# Patient Record
Sex: FEMALE | Race: WHITE | NOT HISPANIC OR LATINO | URBAN - METROPOLITAN AREA
[De-identification: names, ages, dates, MRNs, and addresses within clinical notes are randomized per-mention and may not be internally consistent; named-entity substitution may affect disease eponyms.]

---

## 2017-03-01 ENCOUNTER — OUTPATIENT (OUTPATIENT)
Dept: OUTPATIENT SERVICES | Facility: HOSPITAL | Age: 72
LOS: 1 days | Discharge: HOME | End: 2017-03-01

## 2017-06-27 DIAGNOSIS — M79.609 PAIN IN UNSPECIFIED LIMB: ICD-10-CM

## 2018-05-15 ENCOUNTER — OUTPATIENT (OUTPATIENT)
Dept: OUTPATIENT SERVICES | Facility: HOSPITAL | Age: 73
LOS: 1 days | Discharge: HOME | End: 2018-05-15

## 2018-05-15 DIAGNOSIS — Z12.31 ENCOUNTER FOR SCREENING MAMMOGRAM FOR MALIGNANT NEOPLASM OF BREAST: ICD-10-CM

## 2018-05-22 PROBLEM — Z00.00 ENCOUNTER FOR PREVENTIVE HEALTH EXAMINATION: Status: ACTIVE | Noted: 2018-05-22

## 2018-06-21 ENCOUNTER — APPOINTMENT (OUTPATIENT)
Dept: SURGERY | Facility: CLINIC | Age: 73
End: 2018-06-21
Payer: MEDICARE

## 2018-06-21 VITALS
WEIGHT: 275 LBS | DIASTOLIC BLOOD PRESSURE: 84 MMHG | HEIGHT: 63 IN | SYSTOLIC BLOOD PRESSURE: 144 MMHG | BODY MASS INDEX: 48.73 KG/M2

## 2018-06-21 DIAGNOSIS — R01.1 CARDIAC MURMUR, UNSPECIFIED: ICD-10-CM

## 2018-06-21 DIAGNOSIS — I10 ESSENTIAL (PRIMARY) HYPERTENSION: ICD-10-CM

## 2018-06-21 DIAGNOSIS — E11.9 TYPE 2 DIABETES MELLITUS W/OUT COMPLICATIONS: ICD-10-CM

## 2018-06-21 DIAGNOSIS — H54.61 UNQUALIFIED VISUAL LOSS, RIGHT EYE, NORMAL VISION LEFT EYE: ICD-10-CM

## 2018-06-21 DIAGNOSIS — G62.9 POLYNEUROPATHY, UNSPECIFIED: ICD-10-CM

## 2018-06-21 DIAGNOSIS — R22.42 LOCALIZED SWELLING, MASS AND LUMP, LEFT LOWER LIMB: ICD-10-CM

## 2018-06-21 PROCEDURE — 99202 OFFICE O/P NEW SF 15 MIN: CPT

## 2018-06-22 PROBLEM — R22.42 MASS OF SOFT TISSUE OF LEFT LOWER EXTREMITY: Status: ACTIVE | Noted: 2018-06-22

## 2018-06-22 PROBLEM — R01.1 HEART MURMUR: Status: ACTIVE | Noted: 2018-06-21

## 2018-06-22 PROBLEM — H54.61 VISION LOSS OF RIGHT EYE: Status: ACTIVE | Noted: 2018-06-21

## 2018-06-22 PROBLEM — I10 HIGH BLOOD PRESSURE: Status: ACTIVE | Noted: 2018-06-21

## 2018-06-22 PROBLEM — G62.9 NEUROPATHY: Status: ACTIVE | Noted: 2018-06-21

## 2018-06-22 PROBLEM — E11.9 DIABETES: Status: ACTIVE | Noted: 2018-06-21

## 2018-06-22 RX ORDER — METHENAMINE HIPPURATE 1 G/1
1 TABLET ORAL
Qty: 180 | Refills: 0 | Status: ACTIVE | COMMUNITY
Start: 2017-12-18

## 2018-06-22 RX ORDER — METFORMIN HYDROCHLORIDE 1000 MG/1
1000 TABLET, COATED ORAL
Qty: 180 | Refills: 0 | Status: ACTIVE | COMMUNITY
Start: 2017-08-25

## 2018-06-22 RX ORDER — GABAPENTIN 400 MG/1
400 CAPSULE ORAL
Qty: 90 | Refills: 0 | Status: ACTIVE | COMMUNITY
Start: 2018-02-15

## 2018-06-22 RX ORDER — SEMAGLUTIDE 1.34 MG/ML
2 INJECTION, SOLUTION SUBCUTANEOUS
Qty: 3 | Refills: 0 | Status: ACTIVE | COMMUNITY
Start: 2018-04-09

## 2018-06-22 RX ORDER — ERGOCALCIFEROL 1.25 MG/1
1.25 MG CAPSULE ORAL
Refills: 0 | Status: ACTIVE | COMMUNITY

## 2018-06-22 RX ORDER — VALSARTAN 160 MG/1
160 TABLET, COATED ORAL
Qty: 90 | Refills: 0 | Status: ACTIVE | COMMUNITY
Start: 2018-03-20

## 2018-06-22 RX ORDER — OMEPRAZOLE 40 MG/1
40 CAPSULE, DELAYED RELEASE ORAL
Refills: 0 | Status: ACTIVE | COMMUNITY

## 2018-06-22 RX ORDER — MOMETASONE FUROATE 1 MG/G
0.1 CREAM TOPICAL
Qty: 60 | Refills: 0 | Status: ACTIVE | COMMUNITY
Start: 2018-03-20

## 2018-06-22 RX ORDER — HYDROCODONE BITARTRATE AND ACETAMINOPHEN 10; 325 MG/1; MG/1
10-325 TABLET ORAL
Qty: 120 | Refills: 0 | Status: ACTIVE | COMMUNITY
Start: 2018-03-15

## 2018-06-22 RX ORDER — INSULIN DETEMIR 100 [IU]/ML
100 INJECTION, SOLUTION SUBCUTANEOUS
Qty: 90 | Refills: 0 | Status: ACTIVE | COMMUNITY
Start: 2018-02-21

## 2018-06-22 RX ORDER — BUMETANIDE 1 MG/1
1 TABLET ORAL
Qty: 90 | Refills: 0 | Status: ACTIVE | COMMUNITY
Start: 2018-04-23

## 2018-06-22 RX ORDER — VALSARTAN 160 MG/1
160 TABLET ORAL
Refills: 0 | Status: ACTIVE | COMMUNITY

## 2018-06-22 RX ORDER — DILTIAZEM HYDROCHLORIDE 360 MG/1
360 CAPSULE, EXTENDED RELEASE ORAL
Qty: 90 | Refills: 0 | Status: ACTIVE | COMMUNITY
Start: 2017-09-25

## 2018-06-22 RX ORDER — LIRAGLUTIDE 6 MG/ML
18 INJECTION SUBCUTANEOUS
Qty: 18 | Refills: 0 | Status: ACTIVE | COMMUNITY
Start: 2018-02-21

## 2018-06-22 RX ORDER — NYSTATIN 100000 [USP'U]/ML
100000 SUSPENSION ORAL
Qty: 600 | Refills: 0 | Status: ACTIVE | COMMUNITY
Start: 2017-09-25

## 2018-06-22 RX ORDER — ALBUTEROL SULFATE 90 UG/1
108 (90 BASE) AEROSOL, METERED RESPIRATORY (INHALATION)
Qty: 255 | Refills: 0 | Status: ACTIVE | COMMUNITY
Start: 2018-02-21

## 2018-06-22 RX ORDER — INSULIN DETEMIR 100 [IU]/ML
100 INJECTION, SOLUTION SUBCUTANEOUS
Refills: 0 | Status: ACTIVE | COMMUNITY

## 2018-06-22 RX ORDER — ROSUVASTATIN CALCIUM 10 MG/1
10 TABLET, FILM COATED ORAL
Qty: 90 | Refills: 0 | Status: ACTIVE | COMMUNITY
Start: 2018-04-04

## 2018-06-22 RX ORDER — CIPROFLOXACIN HYDROCHLORIDE 500 MG/1
500 TABLET, FILM COATED ORAL
Qty: 20 | Refills: 0 | Status: ACTIVE | COMMUNITY
Start: 2018-03-19

## 2018-06-22 RX ORDER — OMEPRAZOLE 40 MG/1
40 CAPSULE, DELAYED RELEASE ORAL
Qty: 90 | Refills: 0 | Status: ACTIVE | COMMUNITY
Start: 2018-03-20

## 2018-06-22 RX ORDER — PREGABALIN 100 MG/1
100 CAPSULE ORAL
Qty: 90 | Refills: 0 | Status: ACTIVE | COMMUNITY
Start: 2018-03-15

## 2018-06-22 RX ORDER — INSULIN LISPRO 100 [IU]/ML
100 INJECTION, SOLUTION INTRAVENOUS; SUBCUTANEOUS
Refills: 0 | Status: ACTIVE | COMMUNITY

## 2018-06-22 RX ORDER — ROSUVASTATIN CALCIUM 5 MG/1
5 TABLET, FILM COATED ORAL
Refills: 0 | Status: ACTIVE | COMMUNITY

## 2018-06-22 RX ORDER — METHENAMINE HIPPURATE 1 G/1
1 TABLET ORAL
Refills: 0 | Status: ACTIVE | COMMUNITY

## 2018-06-22 RX ORDER — METOPROLOL TARTRATE 50 MG/1
50 TABLET, FILM COATED ORAL
Qty: 180 | Refills: 0 | Status: ACTIVE | COMMUNITY
Start: 2017-03-07

## 2018-06-22 RX ORDER — NITROFURANTOIN (MONOHYDRATE/MACROCRYSTALS) 25; 75 MG/1; MG/1
100 CAPSULE ORAL
Qty: 20 | Refills: 0 | Status: ACTIVE | COMMUNITY
Start: 2018-03-07

## 2018-06-22 RX ORDER — FUROSEMIDE 40 MG/1
40 TABLET ORAL
Refills: 0 | Status: ACTIVE | COMMUNITY

## 2018-06-22 RX ORDER — METOPROLOL TARTRATE 50 MG/1
50 TABLET, FILM COATED ORAL
Refills: 0 | Status: ACTIVE | COMMUNITY

## 2018-06-22 RX ORDER — INSULIN LISPRO 100 [IU]/ML
100 INJECTION, SOLUTION INTRAVENOUS; SUBCUTANEOUS
Qty: 60 | Refills: 0 | Status: ACTIVE | COMMUNITY
Start: 2018-02-21

## 2018-06-22 RX ORDER — GENTAMICIN SULFATE 1 MG/G
0.1 OINTMENT TOPICAL
Qty: 30 | Refills: 0 | Status: ACTIVE | COMMUNITY
Start: 2018-05-25

## 2018-06-22 RX ORDER — DULOXETINE HYDROCHLORIDE 60 MG/1
60 CAPSULE, DELAYED RELEASE PELLETS ORAL
Refills: 0 | Status: ACTIVE | COMMUNITY

## 2018-06-22 RX ORDER — ZOLPIDEM TARTRATE 10 MG/1
10 TABLET ORAL
Qty: 30 | Refills: 0 | Status: ACTIVE | COMMUNITY
Start: 2018-03-15

## 2018-06-22 RX ORDER — DILTIAZEM HYDROCHLORIDE 180 MG/1
180 CAPSULE, COATED, EXTENDED RELEASE ORAL
Refills: 0 | Status: ACTIVE | COMMUNITY

## 2018-06-22 RX ORDER — FLUCONAZOLE 150 MG/1
150 TABLET ORAL
Qty: 1 | Refills: 0 | Status: ACTIVE | COMMUNITY
Start: 2018-04-07

## 2018-06-22 RX ORDER — DULOXETINE HYDROCHLORIDE 60 MG/1
60 CAPSULE, DELAYED RELEASE PELLETS ORAL
Qty: 90 | Refills: 0 | Status: ACTIVE | COMMUNITY
Start: 2017-10-30

## 2018-06-22 RX ORDER — GABAPENTIN 600 MG/1
600 TABLET, COATED ORAL
Qty: 90 | Refills: 0 | Status: ACTIVE | COMMUNITY
Start: 2018-04-20

## 2018-06-22 RX ORDER — OXYCODONE 10 MG/1
10 TABLET ORAL
Refills: 0 | Status: ACTIVE | COMMUNITY

## 2018-06-22 RX ORDER — AMOXICILLIN AND CLAVULANATE POTASSIUM 875; 125 MG/1; MG/1
875-125 TABLET, COATED ORAL
Qty: 20 | Refills: 0 | Status: ACTIVE | COMMUNITY
Start: 2018-06-05

## 2018-06-22 RX ORDER — PREGABALIN 100 MG/1
100 CAPSULE ORAL
Refills: 0 | Status: ACTIVE | COMMUNITY

## 2018-06-22 RX ORDER — FLUTICASONE PROPIONATE 50 UG/1
50 SPRAY, METERED NASAL
Qty: 16 | Refills: 0 | Status: ACTIVE | COMMUNITY
Start: 2018-03-19

## 2018-06-22 RX ORDER — ALPRAZOLAM 0.25 MG/1
0.25 TABLET ORAL
Qty: 10 | Refills: 0 | Status: ACTIVE | COMMUNITY
Start: 2018-06-14

## 2019-11-05 ENCOUNTER — OUTPATIENT (OUTPATIENT)
Dept: OUTPATIENT SERVICES | Facility: HOSPITAL | Age: 74
LOS: 1 days | Discharge: HOME | End: 2019-11-05
Payer: MEDICARE

## 2019-11-05 DIAGNOSIS — Z12.31 ENCOUNTER FOR SCREENING MAMMOGRAM FOR MALIGNANT NEOPLASM OF BREAST: ICD-10-CM

## 2019-11-05 PROCEDURE — 77063 BREAST TOMOSYNTHESIS BI: CPT | Mod: 26

## 2019-11-05 PROCEDURE — 77067 SCR MAMMO BI INCL CAD: CPT | Mod: 26

## 2020-06-16 ENCOUNTER — APPOINTMENT (OUTPATIENT)
Dept: BURN CARE | Facility: CLINIC | Age: 75
End: 2020-06-16
Payer: MEDICARE

## 2020-06-16 ENCOUNTER — OUTPATIENT (OUTPATIENT)
Dept: OUTPATIENT SERVICES | Facility: HOSPITAL | Age: 75
LOS: 1 days | Discharge: HOME | End: 2020-06-16

## 2020-06-16 ENCOUNTER — INPATIENT (INPATIENT)
Facility: HOSPITAL | Age: 75
LOS: 4 days | Discharge: NURSING, REHAB OR LONG-TERM CARE | End: 2020-06-21
Attending: INTERNAL MEDICINE | Admitting: INTERNAL MEDICINE
Payer: MEDICARE

## 2020-06-16 VITALS
TEMPERATURE: 98 F | RESPIRATION RATE: 18 BRPM | DIASTOLIC BLOOD PRESSURE: 83 MMHG | SYSTOLIC BLOOD PRESSURE: 129 MMHG | HEART RATE: 78 BPM | OXYGEN SATURATION: 97 %

## 2020-06-16 LAB
ALBUMIN SERPL ELPH-MCNC: 3.7 G/DL — SIGNIFICANT CHANGE UP (ref 3.5–5.2)
ALP SERPL-CCNC: 69 U/L — SIGNIFICANT CHANGE UP (ref 30–115)
ALT FLD-CCNC: 14 U/L — SIGNIFICANT CHANGE UP (ref 0–41)
ANION GAP SERPL CALC-SCNC: 12 MMOL/L — SIGNIFICANT CHANGE UP (ref 7–14)
APTT BLD: 32.1 SEC — SIGNIFICANT CHANGE UP (ref 27–39.2)
AST SERPL-CCNC: 13 U/L — SIGNIFICANT CHANGE UP (ref 0–41)
BASOPHILS # BLD AUTO: 0.02 K/UL — SIGNIFICANT CHANGE UP (ref 0–0.2)
BASOPHILS NFR BLD AUTO: 0.2 % — SIGNIFICANT CHANGE UP (ref 0–1)
BILIRUB SERPL-MCNC: <0.2 MG/DL — SIGNIFICANT CHANGE UP (ref 0.2–1.2)
BLD GP AB SCN SERPL QL: SIGNIFICANT CHANGE UP
BUN SERPL-MCNC: 20 MG/DL — SIGNIFICANT CHANGE UP (ref 10–20)
CALCIUM SERPL-MCNC: 9 MG/DL — SIGNIFICANT CHANGE UP (ref 8.5–10.1)
CHLORIDE SERPL-SCNC: 95 MMOL/L — LOW (ref 98–110)
CO2 SERPL-SCNC: 28 MMOL/L — SIGNIFICANT CHANGE UP (ref 17–32)
CREAT SERPL-MCNC: 0.9 MG/DL — SIGNIFICANT CHANGE UP (ref 0.7–1.5)
EOSINOPHIL # BLD AUTO: 0.11 K/UL — SIGNIFICANT CHANGE UP (ref 0–0.7)
EOSINOPHIL NFR BLD AUTO: 1 % — SIGNIFICANT CHANGE UP (ref 0–8)
GLUCOSE SERPL-MCNC: 136 MG/DL — HIGH (ref 70–99)
HCT VFR BLD CALC: 29.9 % — LOW (ref 37–47)
HGB BLD-MCNC: 9.7 G/DL — LOW (ref 12–16)
IMM GRANULOCYTES NFR BLD AUTO: 0.7 % — HIGH (ref 0.1–0.3)
INR BLD: 1.22 RATIO — SIGNIFICANT CHANGE UP (ref 0.65–1.3)
LYMPHOCYTES # BLD AUTO: 1.84 K/UL — SIGNIFICANT CHANGE UP (ref 1.2–3.4)
LYMPHOCYTES # BLD AUTO: 16.3 % — LOW (ref 20.5–51.1)
MAGNESIUM SERPL-MCNC: 2.5 MG/DL — HIGH (ref 1.8–2.4)
MCHC RBC-ENTMCNC: 29.7 PG — SIGNIFICANT CHANGE UP (ref 27–31)
MCHC RBC-ENTMCNC: 32.4 G/DL — SIGNIFICANT CHANGE UP (ref 32–37)
MCV RBC AUTO: 91.4 FL — SIGNIFICANT CHANGE UP (ref 81–99)
MONOCYTES # BLD AUTO: 0.71 K/UL — HIGH (ref 0.1–0.6)
MONOCYTES NFR BLD AUTO: 6.3 % — SIGNIFICANT CHANGE UP (ref 1.7–9.3)
NEUTROPHILS # BLD AUTO: 8.55 K/UL — HIGH (ref 1.4–6.5)
NEUTROPHILS NFR BLD AUTO: 75.5 % — HIGH (ref 42.2–75.2)
NRBC # BLD: 0 /100 WBCS — SIGNIFICANT CHANGE UP (ref 0–0)
PHOSPHATE SERPL-MCNC: 3.8 MG/DL — SIGNIFICANT CHANGE UP (ref 2.1–4.9)
PLATELET # BLD AUTO: 194 K/UL — SIGNIFICANT CHANGE UP (ref 130–400)
POTASSIUM SERPL-MCNC: 5 MMOL/L — SIGNIFICANT CHANGE UP (ref 3.5–5)
POTASSIUM SERPL-SCNC: 5 MMOL/L — SIGNIFICANT CHANGE UP (ref 3.5–5)
PROT SERPL-MCNC: 5.8 G/DL — LOW (ref 6–8)
PROTHROM AB SERPL-ACNC: 14 SEC — HIGH (ref 9.95–12.87)
RBC # BLD: 3.27 M/UL — LOW (ref 4.2–5.4)
RBC # FLD: 16.4 % — HIGH (ref 11.5–14.5)
SODIUM SERPL-SCNC: 135 MMOL/L — SIGNIFICANT CHANGE UP (ref 135–146)
WBC # BLD: 11.31 K/UL — HIGH (ref 4.8–10.8)
WBC # FLD AUTO: 11.31 K/UL — HIGH (ref 4.8–10.8)

## 2020-06-16 PROCEDURE — 99202 OFFICE O/P NEW SF 15 MIN: CPT

## 2020-06-16 PROCEDURE — 99497 ADVNCD CARE PLAN 30 MIN: CPT | Mod: 25

## 2020-06-16 PROCEDURE — 99285 EMERGENCY DEPT VISIT HI MDM: CPT | Mod: GC

## 2020-06-16 PROCEDURE — 99223 1ST HOSP IP/OBS HIGH 75: CPT

## 2020-06-16 PROCEDURE — 93010 ELECTROCARDIOGRAM REPORT: CPT

## 2020-06-16 PROCEDURE — 16030 DRESS/DEBRID P-THICK BURN L: CPT

## 2020-06-16 PROCEDURE — 71045 X-RAY EXAM CHEST 1 VIEW: CPT | Mod: 26

## 2020-06-16 RX ORDER — DULOXETINE HYDROCHLORIDE 30 MG/1
1 CAPSULE, DELAYED RELEASE ORAL
Qty: 0 | Refills: 0 | DISCHARGE

## 2020-06-16 RX ORDER — DULOXETINE HYDROCHLORIDE 30 MG/1
60 CAPSULE, DELAYED RELEASE ORAL DAILY
Refills: 0 | Status: DISCONTINUED | OUTPATIENT
Start: 2020-06-16 | End: 2020-06-21

## 2020-06-16 RX ORDER — ATORVASTATIN CALCIUM 80 MG/1
80 TABLET, FILM COATED ORAL AT BEDTIME
Refills: 0 | Status: DISCONTINUED | OUTPATIENT
Start: 2020-06-16 | End: 2020-06-21

## 2020-06-16 RX ORDER — ASPIRIN/CALCIUM CARB/MAGNESIUM 324 MG
1 TABLET ORAL
Qty: 0 | Refills: 0 | DISCHARGE

## 2020-06-16 RX ORDER — ROSUVASTATIN CALCIUM 5 MG/1
1 TABLET ORAL
Qty: 0 | Refills: 0 | DISCHARGE

## 2020-06-16 RX ORDER — VANCOMYCIN HCL 1 G
1000 VIAL (EA) INTRAVENOUS EVERY 8 HOURS
Refills: 0 | Status: DISCONTINUED | OUTPATIENT
Start: 2020-06-16 | End: 2020-06-17

## 2020-06-16 RX ORDER — DEXTROSE 50 % IN WATER 50 %
25 SYRINGE (ML) INTRAVENOUS ONCE
Refills: 0 | Status: DISCONTINUED | OUTPATIENT
Start: 2020-06-16 | End: 2020-06-21

## 2020-06-16 RX ORDER — APIXABAN 2.5 MG/1
1 TABLET, FILM COATED ORAL
Qty: 0 | Refills: 0 | DISCHARGE

## 2020-06-16 RX ORDER — FERROUS SULFATE 325(65) MG
1 TABLET ORAL
Qty: 0 | Refills: 0 | DISCHARGE

## 2020-06-16 RX ORDER — VANCOMYCIN HCL 1 G
2000 VIAL (EA) INTRAVENOUS EVERY 12 HOURS
Refills: 0 | Status: DISCONTINUED | OUTPATIENT
Start: 2020-06-16 | End: 2020-06-16

## 2020-06-16 RX ORDER — METOPROLOL TARTRATE 50 MG
50 TABLET ORAL
Refills: 0 | Status: DISCONTINUED | OUTPATIENT
Start: 2020-06-16 | End: 2020-06-21

## 2020-06-16 RX ORDER — DEXTROSE 50 % IN WATER 50 %
15 SYRINGE (ML) INTRAVENOUS ONCE
Refills: 0 | Status: DISCONTINUED | OUTPATIENT
Start: 2020-06-16 | End: 2020-06-21

## 2020-06-16 RX ORDER — OMEPRAZOLE 10 MG/1
0 CAPSULE, DELAYED RELEASE ORAL
Qty: 0 | Refills: 0 | DISCHARGE

## 2020-06-16 RX ORDER — SPIRONOLACTONE 25 MG/1
25 TABLET, FILM COATED ORAL
Refills: 0 | Status: DISCONTINUED | OUTPATIENT
Start: 2020-06-16 | End: 2020-06-21

## 2020-06-16 RX ORDER — DEXTROSE 50 % IN WATER 50 %
12.5 SYRINGE (ML) INTRAVENOUS ONCE
Refills: 0 | Status: DISCONTINUED | OUTPATIENT
Start: 2020-06-16 | End: 2020-06-21

## 2020-06-16 RX ORDER — FERROUS SULFATE 325(65) MG
325 TABLET ORAL
Refills: 0 | Status: DISCONTINUED | OUTPATIENT
Start: 2020-06-16 | End: 2020-06-21

## 2020-06-16 RX ORDER — CHLORHEXIDINE GLUCONATE 213 G/1000ML
1 SOLUTION TOPICAL
Refills: 0 | Status: DISCONTINUED | OUTPATIENT
Start: 2020-06-16 | End: 2020-06-21

## 2020-06-16 RX ORDER — ASPIRIN/CALCIUM CARB/MAGNESIUM 324 MG
81 TABLET ORAL DAILY
Refills: 0 | Status: DISCONTINUED | OUTPATIENT
Start: 2020-06-16 | End: 2020-06-21

## 2020-06-16 RX ORDER — GLUCAGON INJECTION, SOLUTION 0.5 MG/.1ML
1 INJECTION, SOLUTION SUBCUTANEOUS ONCE
Refills: 0 | Status: DISCONTINUED | OUTPATIENT
Start: 2020-06-16 | End: 2020-06-21

## 2020-06-16 RX ORDER — DILTIAZEM HCL 120 MG
360 CAPSULE, EXT RELEASE 24 HR ORAL DAILY
Refills: 0 | Status: DISCONTINUED | OUTPATIENT
Start: 2020-06-16 | End: 2020-06-21

## 2020-06-16 RX ORDER — INSULIN LISPRO 100/ML
VIAL (ML) SUBCUTANEOUS
Refills: 0 | Status: DISCONTINUED | OUTPATIENT
Start: 2020-06-16 | End: 2020-06-18

## 2020-06-16 RX ORDER — SODIUM CHLORIDE 9 MG/ML
1000 INJECTION, SOLUTION INTRAVENOUS
Refills: 0 | Status: DISCONTINUED | OUTPATIENT
Start: 2020-06-16 | End: 2020-06-21

## 2020-06-16 RX ORDER — SPIRONOLACTONE 25 MG/1
1 TABLET, FILM COATED ORAL
Qty: 0 | Refills: 0 | DISCHARGE

## 2020-06-16 RX ORDER — INSULIN LISPRO 100/ML
15 VIAL (ML) SUBCUTANEOUS
Refills: 0 | Status: DISCONTINUED | OUTPATIENT
Start: 2020-06-16 | End: 2020-06-18

## 2020-06-16 RX ORDER — INSULIN GLARGINE 100 [IU]/ML
35 INJECTION, SOLUTION SUBCUTANEOUS
Refills: 0 | Status: DISCONTINUED | OUTPATIENT
Start: 2020-06-16 | End: 2020-06-19

## 2020-06-16 RX ORDER — PANTOPRAZOLE SODIUM 20 MG/1
40 TABLET, DELAYED RELEASE ORAL
Refills: 0 | Status: DISCONTINUED | OUTPATIENT
Start: 2020-06-16 | End: 2020-06-21

## 2020-06-16 RX ORDER — APIXABAN 2.5 MG/1
5 TABLET, FILM COATED ORAL EVERY 12 HOURS
Refills: 0 | Status: DISCONTINUED | OUTPATIENT
Start: 2020-06-16 | End: 2020-06-21

## 2020-06-16 RX ADMIN — APIXABAN 5 MILLIGRAM(S): 2.5 TABLET, FILM COATED ORAL at 21:54

## 2020-06-16 RX ADMIN — Medication 100 MILLIGRAM(S): at 21:54

## 2020-06-16 RX ADMIN — Medication 50 MILLIGRAM(S): at 21:54

## 2020-06-16 RX ADMIN — Medication 250 MILLIGRAM(S): at 21:54

## 2020-06-16 RX ADMIN — ATORVASTATIN CALCIUM 80 MILLIGRAM(S): 80 TABLET, FILM COATED ORAL at 21:54

## 2020-06-16 NOTE — CONSULT NOTE ADULT - SUBJECTIVE AND OBJECTIVE BOX
76 y/o female with PMH of DM, HTN, Valve replacement, Pacemaker, Asthma b/l knee replacement and spinal fusion presented to burn clinic on 6/16 with complaint of wound to Left lower extremity. Per patient she developed swelling to bilateral lower extremities about two weeks ago that progressively got more painful and edematous. Pt could no longer tolerate pain and was recommended to see wound specialist by home nurse. Pt scheduled appointment at burn clinic and was seen today By Dr. Bolden who recommended IV antibiotics for B/L LE cellulitis and wound care.       INTERVAL HPI/OVERNIGHT EVENTS:  ICU Vital Signs Last 24 Hrs  T(C): 36.7 (16 Jun 2020 16:45), Max: 36.7 (16 Jun 2020 16:45)  T(F): 98.1 (16 Jun 2020 16:45), Max: 98.1 (16 Jun 2020 16:45)  HR: 78 (16 Jun 2020 16:45) (78 - 78)  BP: 129/83 (16 Jun 2020 16:45) (129/83 - 129/83)    PHYSICAL EXAM:  GENERAL: well built, well nourished  HEAD:  Atraumatic, Normocephalic  SKIN: No rashes or lesions  Wound: Marked swelling, erythema and tenderness to B/L LE extremity. Left leg very tender with some small areas of raw skin to anterior aspect of leg.

## 2020-06-16 NOTE — H&P ADULT - ASSESSMENT
74 y/o female with PMH of DM, HTN, Valve replacement, Pacemaker, Asthma, b/l knee replacement and spinal fusion comes to the ED with lower leg swelling.     #) Lower Extremity Cellulitis  - HD stable, Sepsis ruled out on admission  - Burn following- wound care as per burn  - ID eval  - check MRSA, blood cx  - Start On vancomycin for now    #) h/o DM  - FS qc hs  - start on insulin if FS > 180  - Hold home med [facility]    #)H/o HTN    #) h/o Asthma  - Duoneb PRN    #) DVT ppx- lovenox  #) gi ppx- not needed  #) ambulate as tolerated  #)diet- dash carb consistent  #) dispo- acute 74 y/o female with PMH of DM, HTN, Valve replacement, Pacemaker, Asthma, b/l knee replacement and spinal fusion comes to the ED with lower leg swelling.     #) Left  Lower Extremity Cellulitis  - HD stable, Sepsis ruled out on admission  - Burn following- wound care as per burn  - ID eval  - check MRSA, blood cx  - Start On vancomycin for now    #) H/o TAVR  #) H/o A fib?  - c/w eliquis, cardizazem, metoprolol, ASA  - c/w spironolactone, torsemide    #) h/o DM  - FS qc hs  - start on insulin if FS > 180  - Hold home med [facility]    #)H/o HTN  - c/w home meds    #) h/o Asthma  - stable  - Duoneb PRN    #) DVT ppx- eliquis  #) gi ppx- on omeprazole at home  #) ambulate as tolerated  #)diet- dash carb consistent  #) dispo- acute 76 y/o female with PMH of DM, HTN, Valve replacement, Pacemaker, Asthma, b/l knee replacement and spinal fusion comes to the ED with lower leg swelling.     #) Left  Lower Extremity Cellulitis  - HD stable, Sepsis ruled out on admission  - Burn following- wound care as per burn, no intervention planned.   - ID eval  - check MRSA, blood cx  - Start On vancomycin for now    #) H/o TAVR  #) H/o A fib?/ CHF- unknown type  - c/w eliquis, Cardizem,  metoprolol, ASA  - c/w spironolactone, torsemide    #) h/o DM  - FS qc hs  - start on insulin if FS > 180  - Hold home med [facility]  - pt has new insulin pump from 2 weeks ago- doesnt know how to use it, will use subq insulin here based on home dose.   - c/w pregabalin    #)H/o HTN  - c/w home meds    #) h/o Asthma  - stable  - Duoneb PRN    #) DVT ppx- eliquis  #) gi ppx- on omeprazole at home  #) ambulate as tolerated  #)diet- dash carb consistent  #) dispo- acute

## 2020-06-16 NOTE — H&P ADULT - NSHPLABSRESULTS_GEN_ALL_CORE
9.7    11.31 )-----------( 194      ( 16 Jun 2020 18:00 )             29.9       06-16    135  |  95<L>  |  20  ----------------------------<  136<H>  5.0   |  28  |  0.9    Ca    9.0      16 Jun 2020 18:00  Phos  3.8     06-16  Mg     2.5     06-16    TPro  5.8<L>  /  Alb  3.7  /  TBili  <0.2  /  DBili  x   /  AST  13  /  ALT  14  /  AlkPhos  69  06-16            PT/INR - ( 16 Jun 2020 18:00 )   PT: 14.00 sec;   INR: 1.22 ratio         PTT - ( 16 Jun 2020 18:00 )  PTT:32.1 sec

## 2020-06-16 NOTE — H&P ADULT - NSHPPHYSICALEXAM_GEN_ALL_CORE
PHYSICAL EXAM:  GENERAL: NAD, well-developed  HEAD:  Atraumatic, Normocephalic  EYES: EOMI, PERRLA, conjunctiva and sclera clear  NECK: Supple, No JVD  CHEST/LUNG: Clear to auscultation bilaterally; No wheeze  HEART: Regular rate and rhythm; No murmurs, rubs, or gallops  ABDOMEN: Soft, Nontender, Nondistended; Bowel sounds present  EXTREMITIES:  Marked swelling, erythema and tenderness to B/L LE extremity. Left leg very tender with some small areas of raw skin to anterior aspect of leg.   PSYCH: AAOx3  NEUROLOGY: non-focal  SKIN: No rashes or lesions

## 2020-06-16 NOTE — ED ADULT NURSE REASSESSMENT NOTE - NS ED NURSE REASSESS COMMENT FT1
patient admitted to medicine and moved to ed3 bed 6.   Patient in stable condition and nad.  Report given to TOMASZ Jaffe.

## 2020-06-16 NOTE — ED PROVIDER NOTE - OBJECTIVE STATEMENT
76 y/o female with PMH of DM, HTN, Valve replacement, Pacemaker, Asthma b/l knee replacement and spinal fusion presented to burn clinic on 6/16 with complaint of wound to Left lower extremity. Per patient she developed swelling to bilateral lower extremities about two weeks ago that progressively got more painful and edematous. Pt could no longer tolerate pain and was recommended to see wound specialist by home nurse. Pt scheduled appointment at burn clinic and was seen today By Dr. Bolden who recommended IV antibiotics for B/L LE cellulitis and wound care. 76 y/o female with PMH of DM, HTN, Valve replacement, Pacemaker, Asthma b/l knee replacement and spinal fusion presented sent over  from Dr Bolden's office from management of b/l LLE wounds.  Per patient she developed swelling to bilateral lower extremities about two weeks ago that progressively got more painful and edematous. Pt scheduled appointment at burn clinic and was seen today By Dr. Bolden who sent to ED for IV antibiotics for B/L LE cellulitis and wound care.

## 2020-06-16 NOTE — H&P ADULT - ATTENDING COMMENTS
76 YO F with a PMH of DM, HTN, TAVR 9 months ago, Morbid obesity, Pacemaker, Asthma, b/l knee replacement, and spinal fusion who presents to the hospital with a c/o B/L LE swelling over the past couple months. However, the LLE has started to weep, become painful, and is red. Denies any CP, SOB, palpitations, N/V/D, ABD pain, or fevers/chills. Of note, the pt is essentially bed-bound and recently flew on a plane from [place]. In the ED, seen by burn and diagnosed with cellulitis and LEs wrapped.    Physical exam shows morbidly obese pt in NAD. VSS, afebrile, not hypoxic on RA. A&Ox3. Non-focal neuro exam. Muscle strength/sensation intact. CTA B/L with no W/C/R. RRR, no M/G/R. ABD is obese, soft and non-tender, normoactive BSs. B/L LEs with 1+ pitting edema and chronic venous stasis skin changes; the LLE has skin ulcers over the anterior/posterior shin with TTP, surrounding erythema, and warmth, there is no crepitus; there is a well-healed surgical scar noted over the knee. Labs and radiology as above.     LLE cellulitis; no sepsis on admission. IV ABXs (Vanco). Check MRSA. Burn is following. ID consult. Wound care and Ace wraps. Doppler of B/L LEs.     Normocytic anemia, no baseline. Pt denies any bleeding symptoms. Send anemia studies.     HX of DM, HTN, TAVR 9 months ago, Morbid obesity, Pacemaker, Asthma, b/l knee replacement, and spinal fusion. Restart home meds. GI and DVT PPX. Inform PCP of pt's admission to hospital. My note supersedes the residents note.

## 2020-06-16 NOTE — ED ADULT NURSE NOTE - NSIMPLEMENTINTERV_GEN_ALL_ED
Implemented All Fall with Harm Risk Interventions:  [place] to call system. Call bell, personal items and telephone within reach. Instruct patient to call for assistance. Room bathroom lighting operational. Non-slip footwear when patient is off stretcher. Physically safe environment: no spills, clutter or unnecessary equipment. Stretcher in lowest position, wheels locked, appropriate side rails in place. Provide visual cue, wrist band, yellow gown, etc. Monitor gait and stability. Monitor for mental status changes and reorient to person, place, and time. Review medications for side effects contributing to fall risk. Reinforce activity limits and safety measures with patient and family. Provide visual clues: red socks.

## 2020-06-16 NOTE — ED PROVIDER NOTE - PHYSICAL EXAMINATION
CONSTITUTIONAL: Well-developed; well-nourished; in no acute distress.   SKIN: warm, dry  HEAD: Normocephalic; atraumatic.  EYES: PERRL, EOMI, normal sclera and conjunctiva   ENT: No nasal discharge; airway clear.  NECK: Supple; non tender.  CARD:  Regular rate and rhythm.   RESP: NO inc WOB   ABD: soft ntnd  EXT: Normal ROM.  , LE dressings in place   LYMPH: No acute cervical adenopathy.  NEURO: Alert, oriented, grossly unremarkable  PSYCH: Cooperative, appropriate.

## 2020-06-16 NOTE — ED PROVIDER NOTE - PROGRESS NOTE DETAILS
OG : Per Burn : ok to start unasyn for infection Attending Note: 76 y/o F with PMH of HTN, DM, Asthma, Pacemaker and Valve replacement. Pt sent in by Dr. Bolden for b/l cellulitis. As per pt she began to develop swelling to LLE 2 weeks ago that has gotten worse. Pt seen in the burn clinic today and was found to have small wounds on the L leg with no surgical intervention needed. Pt was referred to ED for ABX for b/l cellulitis.  PE: CON: ao x 3, HENMT: clear oropharynx, neck supple, CV: rrr, equal pulses b/l, RESP: cta b/l, GI:  soft, nontender, no rebound, no guarding, SKIN: wounds on L leg b/l, MSK: no deformities, NEURO: no gross motor or sensory deficit Psychiatric: appropriate mood, appropriate affect  Plan: Labs, imaging, admission to medicine.

## 2020-06-16 NOTE — H&P ADULT - HISTORY OF PRESENT ILLNESS
Pt is a 76 y/o female with PMH of DM, HTN, Valve replacement, Pacemaker, Asthma, b/l knee replacement and spinal fusion comes to the ED with lower leg swelling.     Pt states that she noticed Lower extremity swelling starting about 2 weeks ago. Pt presented to burn clinic today for evaluation. Pt states that it has been getting more painful and edematous. Pt was sent to the ED by Dr Bolden for IV abx. Pt denies any fever, chills, GI/ complaints.   VS on arrival noted to be stable.   Pt seen by Burn and recommended- IV abx, No plan for intervention. Pt is a 74 y/o female with PMH of DM, HTN, TAVR 9 months ago, Pacemaker, Asthma, b/l knee replacement and spinal fusion comes to the ED with lower leg swelling.   Pt states that she noticed left Lower extremity swelling starting about 2 weeks ago. Pt presented to burn clinic today for evaluation. Pt states that it has been getting more painful and edematous. Pt was sent to the ED by Dr Bolden for IV abx. Pt denies any fever, chills, GI/ complaints.   VS on arrival noted to be stable.   Pt seen by Burn and recommended- IV abx, No plan for intervention.

## 2020-06-16 NOTE — CONSULT NOTE ADULT - ASSESSMENT
76 y/o female with extensive PMH presents with B/L LE cellulitis   - no recommendations for surgery at this time   - Admit to Medicine for IV abx.   - LWC: xeroform to raw areas, kerlix/ACE   - will follow

## 2020-06-16 NOTE — ED PROVIDER NOTE - NS ED ROS FT
Constitutional: (-) fever  Eyes/ENT: (-) blurry vision, (-) epistaxis  Cardiovascular: (-) chest pain, (-) syncope  Respiratory: (-) cough, (-) shortness of breath  Gastrointestinal: (-) vomiting, (-) diarrhea  Integumentary: (-) rash, (-) edema  Neurological: (-) headache, (-) altered mental status  Psychiatric: (-) hallucinations  Allergic/Immunologic: (-) pruritus

## 2020-06-17 DIAGNOSIS — Z02.9 ENCOUNTER FOR ADMINISTRATIVE EXAMINATIONS, UNSPECIFIED: ICD-10-CM

## 2020-06-17 LAB
A1C WITH ESTIMATED AVERAGE GLUCOSE RESULT: 8.3 % — HIGH (ref 4–5.6)
ALBUMIN SERPL ELPH-MCNC: 3.7 G/DL — SIGNIFICANT CHANGE UP (ref 3.5–5.2)
ALP SERPL-CCNC: 70 U/L — SIGNIFICANT CHANGE UP (ref 30–115)
ALT FLD-CCNC: 15 U/L — SIGNIFICANT CHANGE UP (ref 0–41)
ANION GAP SERPL CALC-SCNC: 11 MMOL/L — SIGNIFICANT CHANGE UP (ref 7–14)
AST SERPL-CCNC: 13 U/L — SIGNIFICANT CHANGE UP (ref 0–41)
BASE EXCESS BLDA CALC-SCNC: 5.9 MMOL/L — HIGH (ref -2–2)
BASOPHILS # BLD AUTO: 0.02 K/UL — SIGNIFICANT CHANGE UP (ref 0–0.2)
BASOPHILS NFR BLD AUTO: 0.3 % — SIGNIFICANT CHANGE UP (ref 0–1)
BILIRUB SERPL-MCNC: 0.3 MG/DL — SIGNIFICANT CHANGE UP (ref 0.2–1.2)
BUN SERPL-MCNC: 20 MG/DL — SIGNIFICANT CHANGE UP (ref 10–20)
CALCIUM SERPL-MCNC: 8.9 MG/DL — SIGNIFICANT CHANGE UP (ref 8.5–10.1)
CHLORIDE SERPL-SCNC: 94 MMOL/L — LOW (ref 98–110)
CO2 SERPL-SCNC: 29 MMOL/L — SIGNIFICANT CHANGE UP (ref 17–32)
CREAT SERPL-MCNC: 0.9 MG/DL — SIGNIFICANT CHANGE UP (ref 0.7–1.5)
EOSINOPHIL # BLD AUTO: 0.13 K/UL — SIGNIFICANT CHANGE UP (ref 0–0.7)
EOSINOPHIL NFR BLD AUTO: 1.7 % — SIGNIFICANT CHANGE UP (ref 0–8)
ESTIMATED AVERAGE GLUCOSE: 192 MG/DL — HIGH (ref 68–114)
GAS PNL BLDA: SIGNIFICANT CHANGE UP
GLUCOSE SERPL-MCNC: 209 MG/DL — HIGH (ref 70–99)
HCO3 BLDA-SCNC: 32 MMOL/L — HIGH (ref 23–27)
HCT VFR BLD CALC: 30.6 % — LOW (ref 37–47)
HCV AB S/CO SERPL IA: 0.03 COI — SIGNIFICANT CHANGE UP
HCV AB SERPL-IMP: SIGNIFICANT CHANGE UP
HGB BLD-MCNC: 9.9 G/DL — LOW (ref 12–16)
HOROWITZ INDEX BLDA+IHG-RTO: SIGNIFICANT CHANGE UP
IMM GRANULOCYTES NFR BLD AUTO: 1 % — HIGH (ref 0.1–0.3)
LYMPHOCYTES # BLD AUTO: 1.86 K/UL — SIGNIFICANT CHANGE UP (ref 1.2–3.4)
LYMPHOCYTES # BLD AUTO: 24.2 % — SIGNIFICANT CHANGE UP (ref 20.5–51.1)
MAGNESIUM SERPL-MCNC: 2.2 MG/DL — SIGNIFICANT CHANGE UP (ref 1.8–2.4)
MCHC RBC-ENTMCNC: 30.4 PG — SIGNIFICANT CHANGE UP (ref 27–31)
MCHC RBC-ENTMCNC: 32.4 G/DL — SIGNIFICANT CHANGE UP (ref 32–37)
MCV RBC AUTO: 93.9 FL — SIGNIFICANT CHANGE UP (ref 81–99)
MONOCYTES # BLD AUTO: 0.45 K/UL — SIGNIFICANT CHANGE UP (ref 0.1–0.6)
MONOCYTES NFR BLD AUTO: 5.9 % — SIGNIFICANT CHANGE UP (ref 1.7–9.3)
MRSA PCR RESULT.: POSITIVE
NEUTROPHILS # BLD AUTO: 5.15 K/UL — SIGNIFICANT CHANGE UP (ref 1.4–6.5)
NEUTROPHILS NFR BLD AUTO: 66.9 % — SIGNIFICANT CHANGE UP (ref 42.2–75.2)
NRBC # BLD: 0 /100 WBCS — SIGNIFICANT CHANGE UP (ref 0–0)
PCO2 BLDA: 51 MMHG — HIGH (ref 38–42)
PH BLDA: 7.4 — SIGNIFICANT CHANGE UP (ref 7.38–7.42)
PLATELET # BLD AUTO: 184 K/UL — SIGNIFICANT CHANGE UP (ref 130–400)
PO2 BLDA: 162 MMHG — HIGH (ref 78–95)
POTASSIUM SERPL-MCNC: 4.9 MMOL/L — SIGNIFICANT CHANGE UP (ref 3.5–5)
POTASSIUM SERPL-SCNC: 4.9 MMOL/L — SIGNIFICANT CHANGE UP (ref 3.5–5)
PROT SERPL-MCNC: 5.8 G/DL — LOW (ref 6–8)
RBC # BLD: 3.26 M/UL — LOW (ref 4.2–5.4)
RBC # FLD: 16.4 % — HIGH (ref 11.5–14.5)
SAO2 % BLDA: 98 % — SIGNIFICANT CHANGE UP (ref 94–98)
SARS-COV-2 RNA SPEC QL NAA+PROBE: SIGNIFICANT CHANGE UP
SODIUM SERPL-SCNC: 134 MMOL/L — LOW (ref 135–146)
TROPONIN T SERPL-MCNC: <0.01 NG/ML — SIGNIFICANT CHANGE UP
TROPONIN T SERPL-MCNC: <0.01 NG/ML — SIGNIFICANT CHANGE UP
WBC # BLD: 7.69 K/UL — SIGNIFICANT CHANGE UP (ref 4.8–10.8)
WBC # FLD AUTO: 7.69 K/UL — SIGNIFICANT CHANGE UP (ref 4.8–10.8)

## 2020-06-17 PROCEDURE — 93925 LOWER EXTREMITY STUDY: CPT | Mod: 26

## 2020-06-17 PROCEDURE — 71045 X-RAY EXAM CHEST 1 VIEW: CPT | Mod: 26

## 2020-06-17 PROCEDURE — 99231 SBSQ HOSP IP/OBS SF/LOW 25: CPT

## 2020-06-17 PROCEDURE — 93970 EXTREMITY STUDY: CPT | Mod: 26

## 2020-06-17 PROCEDURE — 93010 ELECTROCARDIOGRAM REPORT: CPT

## 2020-06-17 PROCEDURE — 99233 SBSQ HOSP IP/OBS HIGH 50: CPT

## 2020-06-17 RX ORDER — OXYCODONE AND ACETAMINOPHEN 5; 325 MG/1; MG/1
2 TABLET ORAL EVERY 6 HOURS
Refills: 0 | Status: DISCONTINUED | OUTPATIENT
Start: 2020-06-17 | End: 2020-06-18

## 2020-06-17 RX ORDER — ACETAMINOPHEN 500 MG
650 TABLET ORAL EVERY 6 HOURS
Refills: 0 | Status: DISCONTINUED | OUTPATIENT
Start: 2020-06-17 | End: 2020-06-21

## 2020-06-17 RX ORDER — VANCOMYCIN HCL 1 G
1000 VIAL (EA) INTRAVENOUS EVERY 12 HOURS
Refills: 0 | Status: DISCONTINUED | OUTPATIENT
Start: 2020-06-17 | End: 2020-06-19

## 2020-06-17 RX ORDER — MUPIROCIN 20 MG/G
1 OINTMENT TOPICAL EVERY 12 HOURS
Refills: 0 | Status: DISCONTINUED | OUTPATIENT
Start: 2020-06-17 | End: 2020-06-21

## 2020-06-17 RX ADMIN — INSULIN GLARGINE 35 UNIT(S): 100 INJECTION, SOLUTION SUBCUTANEOUS at 07:46

## 2020-06-17 RX ADMIN — Medication 1 APPLICATION(S): at 22:14

## 2020-06-17 RX ADMIN — Medication 325 MILLIGRAM(S): at 05:36

## 2020-06-17 RX ADMIN — MUPIROCIN 1 APPLICATION(S): 20 OINTMENT TOPICAL at 18:26

## 2020-06-17 RX ADMIN — Medication 50 MILLIGRAM(S): at 05:35

## 2020-06-17 RX ADMIN — Medication 15 UNIT(S): at 07:44

## 2020-06-17 RX ADMIN — OXYCODONE AND ACETAMINOPHEN 2 TABLET(S): 5; 325 TABLET ORAL at 07:42

## 2020-06-17 RX ADMIN — Medication 50 MILLIGRAM(S): at 18:26

## 2020-06-17 RX ADMIN — INSULIN GLARGINE 35 UNIT(S): 100 INJECTION, SOLUTION SUBCUTANEOUS at 22:15

## 2020-06-17 RX ADMIN — OXYCODONE AND ACETAMINOPHEN 2 TABLET(S): 5; 325 TABLET ORAL at 00:08

## 2020-06-17 RX ADMIN — DULOXETINE HYDROCHLORIDE 60 MILLIGRAM(S): 30 CAPSULE, DELAYED RELEASE ORAL at 11:32

## 2020-06-17 RX ADMIN — APIXABAN 5 MILLIGRAM(S): 2.5 TABLET, FILM COATED ORAL at 18:26

## 2020-06-17 RX ADMIN — Medication 15 UNIT(S): at 18:02

## 2020-06-17 RX ADMIN — Medication 250 MILLIGRAM(S): at 18:26

## 2020-06-17 RX ADMIN — Medication 10 MILLIGRAM(S): at 05:36

## 2020-06-17 RX ADMIN — PANTOPRAZOLE SODIUM 40 MILLIGRAM(S): 20 TABLET, DELAYED RELEASE ORAL at 05:36

## 2020-06-17 RX ADMIN — Medication 81 MILLIGRAM(S): at 11:32

## 2020-06-17 RX ADMIN — Medication 3: at 18:03

## 2020-06-17 RX ADMIN — Medication 250 MILLIGRAM(S): at 05:35

## 2020-06-17 RX ADMIN — Medication 100 MILLIGRAM(S): at 19:06

## 2020-06-17 RX ADMIN — ATORVASTATIN CALCIUM 80 MILLIGRAM(S): 80 TABLET, FILM COATED ORAL at 22:15

## 2020-06-17 RX ADMIN — OXYCODONE AND ACETAMINOPHEN 2 TABLET(S): 5; 325 TABLET ORAL at 20:28

## 2020-06-17 RX ADMIN — SPIRONOLACTONE 25 MILLIGRAM(S): 25 TABLET, FILM COATED ORAL at 07:51

## 2020-06-17 RX ADMIN — Medication 100 MILLIGRAM(S): at 05:35

## 2020-06-17 RX ADMIN — Medication 325 MILLIGRAM(S): at 18:26

## 2020-06-17 RX ADMIN — Medication 2: at 07:44

## 2020-06-17 RX ADMIN — Medication 360 MILLIGRAM(S): at 05:36

## 2020-06-17 RX ADMIN — APIXABAN 5 MILLIGRAM(S): 2.5 TABLET, FILM COATED ORAL at 05:36

## 2020-06-17 RX ADMIN — Medication 100 MILLIGRAM(S): at 22:14

## 2020-06-17 NOTE — CONSULT NOTE ADULT - SUBJECTIVE AND OBJECTIVE BOX
VASCULAR SURGERY CONSULT NOTE      HPI:  Pt is a 76 y/o female with PMH of DM, HTN, TAVR 9 months ago, Pacemaker, Asthma, b/l knee replacement and spinal fusion comes to the ED with lower leg swelling.   Pt states that she noticed left Lower extremity swelling starting about 2 weeks ago. Pt presented to burn clinic today for evaluation. Pt states that it has been getting more painful and edematous. Pt was sent to the ED by Dr Bolden for IV abx. Pt denies any fever, chills, GI/ complaints.   VS on arrival noted to be stable.   Pt seen by Burn and recommended- IV abx, No plan for intervention. (16 Jun 2020 20:22)        PAST MEDICAL & SURGICAL HISTORY:  Pacemaker  Asthma  HTN (hypertension)  DM (diabetes mellitus)    No Known Allergies    Home Medications:  aspirin 81 mg oral tablet: 1 tab(s) orally once a day (16 Jun 2020 21:03)  dilTIAZem 360 mg/24 hours oral tablet, extended release: 1 tab(s) orally once a day (16 Jun 2020 21:03)  DULoxetine 60 mg oral delayed release capsule: 1 cap(s) orally once a day (16 Jun 2020 21:03)  Eliquis 5 mg oral tablet: 1 tab(s) orally 2 times a day (16 Jun 2020 21:03)  ferrous sulfate 325 mg (65 mg elemental iron) oral tablet: 1 tab(s) orally 3 times a day (16 Jun 2020 21:03)  HumaLOG:  (16 Jun 2020 21:03)  metFORMIN 1000 mg oral tablet: 1 tab(s) orally 2 times a day (16 Jun 2020 21:03)  Metoprolol Tartrate 50 mg oral tablet: 1 tab(s) orally 2 times a day (16 Jun 2020 21:03)  omeprazole 40 mg oral delayed release capsule:  (16 Jun 2020 21:03)  pregabalin 100 mg oral capsule: 1 tab(s) orally 3 times a day (16 Jun 2020 21:03)  rosuvastatin 20 mg oral tablet: 1 tab(s) orally once a day (16 Jun 2020 21:03)  spironolactone 25 mg oral tablet: 1 tab(s) orally every other day (at bedtime) (16 Jun 2020 21:03)  torsemide 10 mg oral tablet: 1 tab(s) orally once a day (16 Jun 2020 21:03)  Tresiba:  (16 Jun 2020 21:03)    No permtinent family history of PVD    REVIEW OF SYSTEMS:  GENERAL:                                         negative  SKIN:                                                 see HPI  OPTHALMOLOGIC:                          negative  ENMT:                                               negative  RESPIRATORY AND THORAX:        see HPI  CARDIOVASCULAR:                         see HPI  GASTROINTESTINAL:                       negative  NEPHROLOGY:                                  negative  MUSCULOSKELETAL:                       negative  NEUROLOGIC:                                   negative  PSYCHIATRIC:                                    negative  HEMATOLOGY/LYMPHATICS:         negative  ENDOCRINE:                                     see HPI  ALLERGIC/IMMUNOLOGIC:            negative    12 point ROS otherwise normal except as stated in HPI    PHYSICAL EXAM  Vital Signs Last 24 Hrs  T(C): 36.1 (17 Jun 2020 07:48), Max: 36.7 (16 Jun 2020 16:45)  T(F): 97 (17 Jun 2020 07:48), Max: 98.1 (16 Jun 2020 16:45)  HR: 66 (17 Jun 2020 07:48) (66 - 78)  BP: 148/66 (17 Jun 2020 07:48) (118/50 - 160/67)  BP(mean): --  RR: 18 (17 Jun 2020 07:48) (18 - 18)  SpO2: 99% (17 Jun 2020 07:48) (97% - 100%)    Appearance: Normal	  HEENT:   Normal oral mucosa, PERRL, EOMI	  Neck: Supple, - JVD; Carotid Bruit   Cardiovascular: Normal S1 S2, No JVD, No murmurs,   Respiratory: Lungs clear to auscultation, No Rales, Rhonchi, Wheezing	  Gastrointestinal:  Soft, Non-tender, positive BS	  Skin: No rashes, No ecchymoses, No cyanosis  Extremities: Normal range of motion, No clubbing, cyanosis or edema  Vascular: Peripheral pulses palpable 2+ bilaterally  Neurologic: Non-focal  Psychiatry: A & O x 3, Mood & affect appropriate      PULSES:  Femoral:  Popliteal:  Dorsal Pedal: dopplerable b/l  Posterior Tibial: dopplerable b/l  Capillary:    MEDICATIONS:   MEDICATIONS  (STANDING):  apixaban 5 milliGRAM(s) Oral every 12 hours  aspirin  chewable 81 milliGRAM(s) Oral daily  atorvastatin 80 milliGRAM(s) Oral at bedtime  chlorhexidine 4% Liquid 1 Application(s) Topical <User Schedule>  dextrose 5%. 1000 milliLiter(s) (50 mL/Hr) IV Continuous <Continuous>  dextrose 50% Injectable 12.5 Gram(s) IV Push once  dextrose 50% Injectable 25 Gram(s) IV Push once  dextrose 50% Injectable 25 Gram(s) IV Push once  diltiazem    milliGRAM(s) Oral daily  DULoxetine 60 milliGRAM(s) Oral daily  ferrous    sulfate 325 milliGRAM(s) Oral two times a day  insulin glargine Injectable (LANTUS) 35 Unit(s) SubCutaneous two times a day  insulin lispro (HumaLOG) corrective regimen sliding scale   SubCutaneous three times a day before meals  insulin lispro Injectable (HumaLOG) 15 Unit(s) SubCutaneous three times a day before meals  metoprolol tartrate 50 milliGRAM(s) Oral two times a day  pantoprazole    Tablet 40 milliGRAM(s) Oral before breakfast  pregabalin 100 milliGRAM(s) Oral three times a day  spironolactone 25 milliGRAM(s) Oral <User Schedule>  torsemide 10 milliGRAM(s) Oral daily  vancomycin  IVPB 1000 milliGRAM(s) IV Intermittent every 8 hours    MEDICATIONS  (PRN):  acetaminophen   Tablet .. 650 milliGRAM(s) Oral every 6 hours PRN Mild Pain (1 - 3)  dextrose 40% Gel 15 Gram(s) Oral once PRN Blood Glucose LESS THAN 70 milliGRAM(s)/deciliter  glucagon  Injectable 1 milliGRAM(s) IntraMuscular once PRN Glucose LESS THAN 70 milligrams/deciliter  oxycodone    5 mG/acetaminophen 325 mG 2 Tablet(s) Oral every 6 hours PRN Moderate Pain (4 - 6)      LAB/STUDIES:                        9.9    7.69  )-----------( 184      ( 17 Jun 2020 05:34 )             30.6     06-17    134<L>  |  94<L>  |  20  ----------------------------<  209<H>  4.9   |  29  |  0.9    Ca    8.9      17 Jun 2020 05:34  Phos  3.8     06-16  Mg     2.2     06-17    TPro  5.8<L>  /  Alb  3.7  /  TBili  0.3  /  DBili  x   /  AST  13  /  ALT  15  /  AlkPhos  70  06-17    PT/INR - ( 16 Jun 2020 18:00 )   PT: 14.00 sec;   INR: 1.22 ratio         PTT - ( 16 Jun 2020 18:00 )  PTT:32.1 sec  LIVER FUNCTIONS - ( 17 Jun 2020 05:34 )  Alb: 3.7 g/dL / Pro: 5.8 g/dL / ALK PHOS: 70 U/L / ALT: 15 U/L / AST: 13 U/L / GGT: x             IMAGING: VASCULAR SURGERY CONSULT NOTE      HPI:  Pt is a 76 y/o female with PMH of DM, HTN, TAVR 9 months ago, Pacemaker, Asthma, b/l knee replacement and spinal fusion comes to the ED with lower leg swelling.   Pt states that she noticed left Lower extremity swelling starting about 2 weeks ago. Pt presented to burn clinic today for evaluation. Pt states that it has been getting more painful and edematous. Pt was sent to the ED by Dr Bolden for IV abx. Pt denies any fever, chills, GI/ complaints.   VS on arrival noted to be stable.   Pt seen by Burn and recommended- IV abx, No plan for intervention. (16 Jun 2020 20:22)        PAST MEDICAL & SURGICAL HISTORY:  Pacemaker  Asthma  HTN (hypertension)  DM (diabetes mellitus)    No Known Allergies    Home Medications:  aspirin 81 mg oral tablet: 1 tab(s) orally once a day (16 Jun 2020 21:03)  dilTIAZem 360 mg/24 hours oral tablet, extended release: 1 tab(s) orally once a day (16 Jun 2020 21:03)  DULoxetine 60 mg oral delayed release capsule: 1 cap(s) orally once a day (16 Jun 2020 21:03)  Eliquis 5 mg oral tablet: 1 tab(s) orally 2 times a day (16 Jun 2020 21:03)  ferrous sulfate 325 mg (65 mg elemental iron) oral tablet: 1 tab(s) orally 3 times a day (16 Jun 2020 21:03)  HumaLOG:  (16 Jun 2020 21:03)  metFORMIN 1000 mg oral tablet: 1 tab(s) orally 2 times a day (16 Jun 2020 21:03)  Metoprolol Tartrate 50 mg oral tablet: 1 tab(s) orally 2 times a day (16 Jun 2020 21:03)  omeprazole 40 mg oral delayed release capsule:  (16 Jun 2020 21:03)  pregabalin 100 mg oral capsule: 1 tab(s) orally 3 times a day (16 Jun 2020 21:03)  rosuvastatin 20 mg oral tablet: 1 tab(s) orally once a day (16 Jun 2020 21:03)  spironolactone 25 mg oral tablet: 1 tab(s) orally every other day (at bedtime) (16 Jun 2020 21:03)  torsemide 10 mg oral tablet: 1 tab(s) orally once a day (16 Jun 2020 21:03)  Tresiba:  (16 Jun 2020 21:03)    No permtinent family history of PVD    REVIEW OF SYSTEMS:  GENERAL:                                         negative  SKIN:                                                 see HPI  OPTHALMOLOGIC:                          negative  ENMT:                                               negative  RESPIRATORY AND THORAX:        see HPI  CARDIOVASCULAR:                         see HPI  GASTROINTESTINAL:                       negative  NEPHROLOGY:                                  negative  MUSCULOSKELETAL:                       negative  NEUROLOGIC:                                   negative  PSYCHIATRIC:                                    negative  HEMATOLOGY/LYMPHATICS:         negative  ENDOCRINE:                                     see HPI  ALLERGIC/IMMUNOLOGIC:            negative    12 point ROS otherwise normal except as stated in HPI    PHYSICAL EXAM  Vital Signs Last 24 Hrs  T(C): 36.1 (17 Jun 2020 07:48), Max: 36.7 (16 Jun 2020 16:45)  T(F): 97 (17 Jun 2020 07:48), Max: 98.1 (16 Jun 2020 16:45)  HR: 66 (17 Jun 2020 07:48) (66 - 78)  BP: 148/66 (17 Jun 2020 07:48) (118/50 - 160/67)  BP(mean): --  RR: 18 (17 Jun 2020 07:48) (18 - 18)  SpO2: 99% (17 Jun 2020 07:48) (97% - 100%)    Appearance: Normal	  HEENT:   Normal oral mucosa, PERRL, EOMI	  Neck: Supple, - JVD;   Cardiovascular: Normal S1 S2, No JVD, No murmurs,   Respiratory: Lungs clear to auscultation, No Rales, Rhonchi, Wheezing	  Gastrointestinal:  Soft, Non-tender, positive BS	  Skin: No rashes, No ecchymoses, No cyanosis  Extremities: Normal range of motion, No clubbing, cyanosis  b/l lower extremity erythema, 2+ edema Left >Right, profound erythema on the left leg ankle to below knee with skin break down, superficial wheeping ulcerations anterior and posterior    Neurologic: Non-focal  Psychiatry: A & O x 3, Mood & affect appropriate      PULSES:  Femoral:  Popliteal:  Dorsal Pedal: dopplerable b/l  Posterior Tibial: dopplerable b/l  Capillary:    MEDICATIONS:   MEDICATIONS  (STANDING):  apixaban 5 milliGRAM(s) Oral every 12 hours  aspirin  chewable 81 milliGRAM(s) Oral daily  atorvastatin 80 milliGRAM(s) Oral at bedtime  chlorhexidine 4% Liquid 1 Application(s) Topical <User Schedule>  dextrose 5%. 1000 milliLiter(s) (50 mL/Hr) IV Continuous <Continuous>  dextrose 50% Injectable 12.5 Gram(s) IV Push once  dextrose 50% Injectable 25 Gram(s) IV Push once  dextrose 50% Injectable 25 Gram(s) IV Push once  diltiazem    milliGRAM(s) Oral daily  DULoxetine 60 milliGRAM(s) Oral daily  ferrous    sulfate 325 milliGRAM(s) Oral two times a day  insulin glargine Injectable (LANTUS) 35 Unit(s) SubCutaneous two times a day  insulin lispro (HumaLOG) corrective regimen sliding scale   SubCutaneous three times a day before meals  insulin lispro Injectable (HumaLOG) 15 Unit(s) SubCutaneous three times a day before meals  metoprolol tartrate 50 milliGRAM(s) Oral two times a day  pantoprazole    Tablet 40 milliGRAM(s) Oral before breakfast  pregabalin 100 milliGRAM(s) Oral three times a day  spironolactone 25 milliGRAM(s) Oral <User Schedule>  torsemide 10 milliGRAM(s) Oral daily  vancomycin  IVPB 1000 milliGRAM(s) IV Intermittent every 8 hours    MEDICATIONS  (PRN):  acetaminophen   Tablet .. 650 milliGRAM(s) Oral every 6 hours PRN Mild Pain (1 - 3)  dextrose 40% Gel 15 Gram(s) Oral once PRN Blood Glucose LESS THAN 70 milliGRAM(s)/deciliter  glucagon  Injectable 1 milliGRAM(s) IntraMuscular once PRN Glucose LESS THAN 70 milligrams/deciliter  oxycodone    5 mG/acetaminophen 325 mG 2 Tablet(s) Oral every 6 hours PRN Moderate Pain (4 - 6)      LAB/STUDIES:                        9.9    7.69  )-----------( 184      ( 17 Jun 2020 05:34 )             30.6     06-17    134<L>  |  94<L>  |  20  ----------------------------<  209<H>  4.9   |  29  |  0.9    Ca    8.9      17 Jun 2020 05:34  Phos  3.8     06-16  Mg     2.2     06-17    TPro  5.8<L>  /  Alb  3.7  /  TBili  0.3  /  DBili  x   /  AST  13  /  ALT  15  /  AlkPhos  70  06-17    PT/INR - ( 16 Jun 2020 18:00 )   PT: 14.00 sec;   INR: 1.22 ratio         PTT - ( 16 Jun 2020 18:00 )  PTT:32.1 sec  LIVER FUNCTIONS - ( 17 Jun 2020 05:34 )  Alb: 3.7 g/dL / Pro: 5.8 g/dL / ALK PHOS: 70 U/L / ALT: 15 U/L / AST: 13 U/L / GGT: x             IMAGING:

## 2020-06-17 NOTE — PROGRESS NOTE ADULT - SUBJECTIVE AND OBJECTIVE BOX
SUBJECTIVE:    Patient is a 76 y/o obese  Female who presents with a chief complaint of Cellulitis (16 Jun 2020 20:22)    Currently admitted to medicine with the primary diagnosis of Cellulitis of left lower extremity.     Today is hospital day 1. Patient was seen and examined at bedside. This morning she is resting in bed and reports no new issues or overnight events. States she still has pain in LLE, approx 9/10 in severity. WBC downtrending, remained afebrile overnight.    PAST MEDICAL & SURGICAL HISTORY  PAST MEDICAL & SURGICAL HISTORY:  Pacemaker  Asthma  HTN (hypertension)  DM (diabetes mellitus)    SOCIAL HISTORY:  negative x 3. Lives at home with family    ALLERGIES:  No Known Allergies    MEDICATIONS:  STANDING MEDICATIONS  apixaban 5 milliGRAM(s) Oral every 12 hours  aspirin  chewable 81 milliGRAM(s) Oral daily  atorvastatin 80 milliGRAM(s) Oral at bedtime  chlorhexidine 4% Liquid 1 Application(s) Topical <User Schedule>  dextrose 5%. 1000 milliLiter(s) IV Continuous <Continuous>  dextrose 50% Injectable 12.5 Gram(s) IV Push once  dextrose 50% Injectable 25 Gram(s) IV Push once  dextrose 50% Injectable 25 Gram(s) IV Push once  diltiazem    milliGRAM(s) Oral daily  DULoxetine 60 milliGRAM(s) Oral daily  ferrous    sulfate 325 milliGRAM(s) Oral two times a day  insulin glargine Injectable (LANTUS) 35 Unit(s) SubCutaneous two times a day  insulin lispro (HumaLOG) corrective regimen sliding scale   SubCutaneous three times a day before meals  insulin lispro Injectable (HumaLOG) 15 Unit(s) SubCutaneous three times a day before meals  metoprolol tartrate 50 milliGRAM(s) Oral two times a day  pantoprazole    Tablet 40 milliGRAM(s) Oral before breakfast  pregabalin 100 milliGRAM(s) Oral three times a day  spironolactone 25 milliGRAM(s) Oral <User Schedule>  torsemide 10 milliGRAM(s) Oral daily  vancomycin  IVPB 1000 milliGRAM(s) IV Intermittent every 8 hours    PRN MEDICATIONS  acetaminophen   Tablet .. 650 milliGRAM(s) Oral every 6 hours PRN  dextrose 40% Gel 15 Gram(s) Oral once PRN  glucagon  Injectable 1 milliGRAM(s) IntraMuscular once PRN  oxycodone    5 mG/acetaminophen 325 mG 2 Tablet(s) Oral every 6 hours PRN    VITALS:   T(F): 97  HR: 66  BP: 148/66  RR: 18  SpO2: 99%    LABS:                        9.9    7.69  )-----------( 184      ( 17 Jun 2020 05:34 )             30.6     06-17    134<L>  |  94<L>  |  20  ----------------------------<  209<H>  4.9   |  29  |  0.9    Ca    8.9      17 Jun 2020 05:34  Phos  3.8     06-16  Mg     2.2     06-17    TPro  5.8<L>  /  Alb  3.7  /  TBili  0.3  /  DBili  x   /  AST  13  /  ALT  15  /  AlkPhos  70  06-17    PT/INR - ( 16 Jun 2020 18:00 )   PT: 14.00 sec;   INR: 1.22 ratio         PTT - ( 16 Jun 2020 18:00 )  PTT:32.1 sec                PHYSICAL EXAM:  GENERAL: Obese, elderly F in NAD, speaks in full sentences, no signs of respiratory distress  HEAD: Atraumatic  NECK: Supple  CHEST/LUNG: Clear to auscultation bilaterally; No wheeze or crackles  HEART: S1, S2; RRR; No murmurs, rubs, or gallops  ABDOMEN: Obese, BS+; Soft, Non-tender, Non-distended  EXTREMITIES:  2+ Peripheral Pulses, No clubbing, cyanosis, or edema. Erythema on left lower extremity along with tenderness to light palpation, and 2+ edema on LLE.  PSYCH: AAOx3  NEUROLOGY: non-focal

## 2020-06-17 NOTE — PROGRESS NOTE ADULT - ASSESSMENT
Cellulitis and PT wounds bilateral LE   Wound care - SSD , Adaptic and dry dressing applied   Abx per ID   Elevation     Medical team contacted and present for eval of pt's current complaints Cellulitis and PT wounds bilateral LE   Wound care - SSD , Adaptic and dry dressing applied   Abx per ID - Vancomycin   Elevation     Arterial duplex results pending     Medical team contacted and present for eval of pt's current complaints

## 2020-06-17 NOTE — CONSULT NOTE ADULT - ASSESSMENT
76 y/o obese, elderly female with PMH of DM, HTN, Valve replacement, Pacemaker, Asthma, b/l knee replacement and spinal fusion comes to the ED with left lower leg pain and swelling.     IMPRESSION;   LLE cellulitis  Superficial non infected wounds    RECOMMENDATIONS;   Zyvox 600 mg iv q12h  local silverdene cream 76 y/o obese, elderly female with PMH of DM, HTN, Valve replacement, Pacemaker, Asthma, b/l knee replacement and spinal fusion comes to the ED with left lower leg pain and swelling.     IMPRESSION;   LLE cellulitis  Superficial non infected wounds    RECOMMENDATIONS;   vancomycin 1 gm iv q12h  local silverdene cream

## 2020-06-17 NOTE — CONSULT NOTE ADULT - ASSESSMENT
76 y/o female with PMH of DM, HTN, TAVR 9 months ago, Pacemaker, Asthma, b/l knee replacement and spinal fusion comes to the ED with lower leg swelling.   Pt states that she noticed left Lower extremity swelling starting about 2 weeks ago.     Vascular Surgery called to evaluate for venous stasis ulcerations in a setting of water retention and cellulitis    Please, obtain venous and arterial dplx b/l    SPECTRA [id number]

## 2020-06-17 NOTE — PROGRESS NOTE ADULT - ASSESSMENT
74 y/o obese, elderly female with PMH of DM, HTN, Valve replacement, Pacemaker, Asthma, b/l knee replacement and spinal fusion comes to the ED with left lower leg pain and swelling.     # Left Lower Extremity Cellulitis  - HD stable, Sepsis ruled out on admission  - WBC downtrendin <-- 13. Afebrile overnight  - Burn following- wound care as per burn, no intervention planned.   - f/u ID and vascular eval (consults placed)  - f/u final burn eval  - f/u MRSA, blood cx  - f/u lower extremity duplex  - Continue vancomycin for now    # H/o TAVR  # H/o A fib?/ CHF- unknown type  - c/w eliquis, Cardizem, metoprolol, ASA  - c/w spironolactone, torsemide    # DM  - FS qc hs  - start on lantus/lispro and SSI  - Hold home med OHA  - pt has new insulin pump from 2 weeks ago- doesnt know how to use it, will use subq insulin here based on home dose.   - c/w pregabalin    # H/o HTN  - c/w home meds    # h/o Asthma - stable  - Duoneb PRN    # Obesity   - patient counselled on importance of healthy diet and exercise  - needs outpatient follow up    DVT ppx- eliquis  GI ppx- on omeprazole at home  Ambulate as tolerated  diet- dash / carb consistent  dispo- acute, from home. lives with family. 76 y/o obese, elderly female with PMH of DM, HTN, Valve replacement, Pacemaker, Asthma, b/l knee replacement and spinal fusion comes to the ED with left lower leg pain and swelling.     # Left Lower Extremity Cellulitis  - HD stable, Sepsis ruled out on admission  - WBC downtrendin <-- 13. Afebrile overnight  - Burn following- wound care as per burn, no intervention planned.   - f/u ID and vascular eval (consults placed)  - f/u final burn eval  - f/u MRSA, blood cx  - f/u lower extremity duplex  - Continue vancomycin for now    # H/o TAVR  # H/o A fib?/ CHF- unknown type  - c/w eliquis, Cardizem, metoprolol, ASA  - c/w spironolactone, torsemide    # DM  - FS qc hs  - start on lantus/lispro and SSI  - Hold home med OHA  - pt has new insulin pump from 2 weeks ago- doesnt know how to use it, will use subq insulin here based on home dose.   - c/w pregabalin    # H/o HTN  - c/w home meds    # h/o Asthma - stable  - Duoneb PRN    # Morbid Obesity,   - patient counselled on importance of healthy diet and exercise, has poor exercise tolerance.   - needs outpatient follow up    DVT ppx- eliquis  GI ppx- on omeprazole at home  Ambulate as tolerated  diet- dash / carb consistent  dispo- acute, from home. lives with family. will need pt eval prior to dc

## 2020-06-17 NOTE — CONSULT NOTE ADULT - SUBJECTIVE AND OBJECTIVE BOX
LAUREN RAGSDALE  75y, Female  Allergy: No Known Allergies      All historical available data reviewed.    HPI:  Pt is a 76 y/o female with PMH of DM, HTN, TAVR 9 months ago, Pacemaker, Asthma, b/l knee replacement and spinal fusion comes to the ED with lower leg swelling.   Pt states that she noticed left Lower extremity swelling starting about 2 weeks ago. Pt presented to burn clinic today for evaluation. Pt states that it has been getting more painful and edematous. Pt was sent to the ED by Dr Bolden for IV abx. Pt denies any fever, chills, GI/ complaints.   VS on arrival noted to be stable.   Pt seen by Burn and recommended- IV abx, No plan for intervention. (16 Jun 2020 20:22)  ID called for cellulitis    FAMILY HISTORY:    PAST MEDICAL & SURGICAL HISTORY:  Pacemaker  Asthma  HTN (hypertension)  DM (diabetes mellitus)        VITALS:  T(F): 97, Max: 98.1 (06-16-20 @ 16:45)  HR: 66  BP: 148/66  RR: 18Vital Signs Last 24 Hrs  T(C): 36.1 (17 Jun 2020 07:48), Max: 36.7 (16 Jun 2020 16:45)  T(F): 97 (17 Jun 2020 07:48), Max: 98.1 (16 Jun 2020 16:45)  HR: 66 (17 Jun 2020 07:48) (66 - 78)  BP: 148/66 (17 Jun 2020 07:48) (118/50 - 160/67)  BP(mean): --  RR: 18 (17 Jun 2020 07:48) (18 - 18)  SpO2: 99% (17 Jun 2020 07:48) (97% - 100%)    TESTS & MEASUREMENTS:                        9.9    7.69  )-----------( 184      ( 17 Jun 2020 05:34 )             30.6     06-17    134<L>  |  94<L>  |  20  ----------------------------<  209<H>  4.9   |  29  |  0.9    Ca    8.9      17 Jun 2020 05:34  Phos  3.8     06-16  Mg     2.2     06-17    TPro  5.8<L>  /  Alb  3.7  /  TBili  0.3  /  DBili  x   /  AST  13  /  ALT  15  /  AlkPhos  70  06-17    LIVER FUNCTIONS - ( 17 Jun 2020 05:34 )  Alb: 3.7 g/dL / Pro: 5.8 g/dL / ALK PHOS: 70 U/L / ALT: 15 U/L / AST: 13 U/L / GGT: x                   RADIOLOGY & ADDITIONAL TESTS:  Personal review of radiological diagnostics performed  Echo and EKG results noted when applicable.     MEDICATIONS:  acetaminophen   Tablet .. 650 milliGRAM(s) Oral every 6 hours PRN  apixaban 5 milliGRAM(s) Oral every 12 hours  aspirin  chewable 81 milliGRAM(s) Oral daily  atorvastatin 80 milliGRAM(s) Oral at bedtime  chlorhexidine 4% Liquid 1 Application(s) Topical <User Schedule>  dextrose 40% Gel 15 Gram(s) Oral once PRN  dextrose 5%. 1000 milliLiter(s) IV Continuous <Continuous>  dextrose 50% Injectable 12.5 Gram(s) IV Push once  dextrose 50% Injectable 25 Gram(s) IV Push once  dextrose 50% Injectable 25 Gram(s) IV Push once  diltiazem    milliGRAM(s) Oral daily  DULoxetine 60 milliGRAM(s) Oral daily  ferrous    sulfate 325 milliGRAM(s) Oral two times a day  glucagon  Injectable 1 milliGRAM(s) IntraMuscular once PRN  insulin glargine Injectable (LANTUS) 35 Unit(s) SubCutaneous two times a day  insulin lispro (HumaLOG) corrective regimen sliding scale   SubCutaneous three times a day before meals  insulin lispro Injectable (HumaLOG) 15 Unit(s) SubCutaneous three times a day before meals  metoprolol tartrate 50 milliGRAM(s) Oral two times a day  mupirocin 2% Ointment 1 Application(s) Topical every 12 hours  oxycodone    5 mG/acetaminophen 325 mG 2 Tablet(s) Oral every 6 hours PRN  pantoprazole    Tablet 40 milliGRAM(s) Oral before breakfast  pregabalin 100 milliGRAM(s) Oral three times a day  spironolactone 25 milliGRAM(s) Oral <User Schedule>  torsemide 10 milliGRAM(s) Oral daily  vancomycin  IVPB 1000 milliGRAM(s) IV Intermittent every 8 hours      ANTIBIOTICS:  vancomycin  IVPB 1000 milliGRAM(s) IV Intermittent every 8 hours

## 2020-06-17 NOTE — PROGRESS NOTE ADULT - SUBJECTIVE AND OBJECTIVE BOX
AM rounds     Pt: complains of some difficulty breathing/ chest discomfort "since last night ". Concerned because of past hx of CHF   No acute events o/n  Vital Signs Last 24 Hrs  T(C): 36.1 (17 Jun 2020 07:48), Max: 36.7 (16 Jun 2020 16:45)  T(F): 97 (17 Jun 2020 07:48), Max: 98.1 (16 Jun 2020 16:45)  HR: 66 (17 Jun 2020 07:48) (66 - 78)  BP: 148/66 (17 Jun 2020 07:48) (118/50 - 160/67)  RR: 18 (17 Jun 2020 07:48) (18 - 18)  SpO2: 99% (17 Jun 2020 07:48) (97% - 100%)        I&O's Summary      06-17    134<L>  |  94<L>  |  20  ----------------------------<  209<H>  4.9   |  29  |  0.9    Ca    8.9      17 Jun 2020 05:34  Phos  3.8     06-16  Mg     2.2     06-17    TPro  5.8<L>  /  Alb  3.7  /  TBili  0.3  /  DBili  x   /  AST  13  /  ALT  15  /  AlkPhos  70  06-17                          9.9    7.69  )-----------( 184      ( 17 Jun 2020 05:34 )             30.6       EXAM:   Pt - awake alert , in NAD   BLE - erythema / cellulitis L> R surrounding PT  wounds ; edema   Wounds - open superficial wounds   Right leg ~ 1.5 cm flat denuded blister ; heel 2 x 2 cm decompressed blister with pale skin   left leg - 3 x 4 cm wound ant and 3 x 3 cm post leg wound AM rounds     Pt: complains of some difficulty breathing/ chest discomfort "since last night ". Concerned because of past hx of CHF   No acute events o/n  Vital Signs Last 24 Hrs  T(C): 36.1 (17 Jun 2020 07:48), Max: 36.7 (16 Jun 2020 16:45)  T(F): 97 (17 Jun 2020 07:48), Max: 98.1 (16 Jun 2020 16:45)  HR: 66 (17 Jun 2020 07:48) (66 - 78)  BP: 148/66 (17 Jun 2020 07:48) (118/50 - 160/67)  RR: 18 (17 Jun 2020 07:48) (18 - 18)  SpO2: 99% (17 Jun 2020 07:48) (97% - 100%)        I&O's Summary      06-17    134<L>  |  94<L>  |  20  ----------------------------<  209<H>  4.9   |  29  |  0.9    Ca    8.9      17 Jun 2020 05:34  Phos  3.8     06-16  Mg     2.2     06-17    TPro  5.8<L>  /  Alb  3.7  /  TBili  0.3  /  DBili  x   /  AST  13  /  ALT  15  /  AlkPhos  70  06-17                          9.9    7.69  )-----------( 184      ( 17 Jun 2020 05:34 )             30.6      VA Duplex Lower Ext Vein Scan, Bilat (06.17.20 @ 08:54) >  Impression:    No evidence of deep venous thrombosis or superficial thrombophlebitis in the bilateral lower extremities.          EXAM:   Pt - awake alert , in NAD   BLE - erythema / cellulitis L> R surrounding PT  wounds ; edema   Wounds - open superficial wounds   Right leg ~ 1.5 cm flat denuded blister ; heel 2 x 2 cm decompressed blister with pale skin   left leg - 3 x 4 cm wound ant and 3 x 3 cm post leg wound

## 2020-06-18 ENCOUNTER — TRANSCRIPTION ENCOUNTER (OUTPATIENT)
Age: 75
End: 2020-06-18

## 2020-06-18 LAB
ALBUMIN SERPL ELPH-MCNC: 3.4 G/DL — LOW (ref 3.5–5.2)
ALP SERPL-CCNC: 74 U/L — SIGNIFICANT CHANGE UP (ref 30–115)
ALT FLD-CCNC: 18 U/L — SIGNIFICANT CHANGE UP (ref 0–41)
ANION GAP SERPL CALC-SCNC: 13 MMOL/L — SIGNIFICANT CHANGE UP (ref 7–14)
APPEARANCE UR: CLEAR — SIGNIFICANT CHANGE UP
AST SERPL-CCNC: 11 U/L — SIGNIFICANT CHANGE UP (ref 0–41)
BACTERIA SPEC CULT: NORMAL
BACTERIA SPEC CULT: NORMAL
BASOPHILS # BLD AUTO: 0.03 K/UL — SIGNIFICANT CHANGE UP (ref 0–0.2)
BASOPHILS NFR BLD AUTO: 0.4 % — SIGNIFICANT CHANGE UP (ref 0–1)
BILIRUB SERPL-MCNC: 0.2 MG/DL — SIGNIFICANT CHANGE UP (ref 0.2–1.2)
BILIRUB UR-MCNC: NEGATIVE — SIGNIFICANT CHANGE UP
BUN SERPL-MCNC: 19 MG/DL — SIGNIFICANT CHANGE UP (ref 10–20)
CALCIUM SERPL-MCNC: 8.8 MG/DL — SIGNIFICANT CHANGE UP (ref 8.5–10.1)
CHLORIDE SERPL-SCNC: 95 MMOL/L — LOW (ref 98–110)
CO2 SERPL-SCNC: 27 MMOL/L — SIGNIFICANT CHANGE UP (ref 17–32)
COLOR SPEC: YELLOW — SIGNIFICANT CHANGE UP
CREAT SERPL-MCNC: 0.9 MG/DL — SIGNIFICANT CHANGE UP (ref 0.7–1.5)
DIFF PNL FLD: SIGNIFICANT CHANGE UP
EOSINOPHIL # BLD AUTO: 0.12 K/UL — SIGNIFICANT CHANGE UP (ref 0–0.7)
EOSINOPHIL NFR BLD AUTO: 1.7 % — SIGNIFICANT CHANGE UP (ref 0–8)
FOLATE SERPL-MCNC: 16.7 NG/ML — SIGNIFICANT CHANGE UP
GLUCOSE BLDC GLUCOMTR-MCNC: 371 MG/DL — HIGH (ref 70–99)
GLUCOSE BLDC GLUCOMTR-MCNC: 378 MG/DL — HIGH (ref 70–99)
GLUCOSE SERPL-MCNC: 376 MG/DL — HIGH (ref 70–99)
GLUCOSE UR QL: ABNORMAL
HCT VFR BLD CALC: 29.4 % — LOW (ref 37–47)
HGB BLD-MCNC: 9.6 G/DL — LOW (ref 12–16)
IMM GRANULOCYTES NFR BLD AUTO: 0.8 % — HIGH (ref 0.1–0.3)
KETONES UR-MCNC: NEGATIVE — SIGNIFICANT CHANGE UP
LEUKOCYTE ESTERASE UR-ACNC: NEGATIVE — SIGNIFICANT CHANGE UP
LYMPHOCYTES # BLD AUTO: 1.51 K/UL — SIGNIFICANT CHANGE UP (ref 1.2–3.4)
LYMPHOCYTES # BLD AUTO: 21.2 % — SIGNIFICANT CHANGE UP (ref 20.5–51.1)
MAGNESIUM SERPL-MCNC: 1.8 MG/DL — SIGNIFICANT CHANGE UP (ref 1.8–2.4)
MCHC RBC-ENTMCNC: 30.4 PG — SIGNIFICANT CHANGE UP (ref 27–31)
MCHC RBC-ENTMCNC: 32.7 G/DL — SIGNIFICANT CHANGE UP (ref 32–37)
MCV RBC AUTO: 93 FL — SIGNIFICANT CHANGE UP (ref 81–99)
MONOCYTES # BLD AUTO: 0.5 K/UL — SIGNIFICANT CHANGE UP (ref 0.1–0.6)
MONOCYTES NFR BLD AUTO: 7 % — SIGNIFICANT CHANGE UP (ref 1.7–9.3)
NEUTROPHILS # BLD AUTO: 4.89 K/UL — SIGNIFICANT CHANGE UP (ref 1.4–6.5)
NEUTROPHILS NFR BLD AUTO: 68.9 % — SIGNIFICANT CHANGE UP (ref 42.2–75.2)
NITRITE UR-MCNC: NEGATIVE — SIGNIFICANT CHANGE UP
NRBC # BLD: 0 /100 WBCS — SIGNIFICANT CHANGE UP (ref 0–0)
PH UR: 6.5 — SIGNIFICANT CHANGE UP (ref 5–8)
PLATELET # BLD AUTO: 180 K/UL — SIGNIFICANT CHANGE UP (ref 130–400)
POTASSIUM SERPL-MCNC: 4.9 MMOL/L — SIGNIFICANT CHANGE UP (ref 3.5–5)
POTASSIUM SERPL-SCNC: 4.9 MMOL/L — SIGNIFICANT CHANGE UP (ref 3.5–5)
PROT SERPL-MCNC: 5.4 G/DL — LOW (ref 6–8)
PROT UR-MCNC: SIGNIFICANT CHANGE UP
RBC # BLD: 3.16 M/UL — LOW (ref 4.2–5.4)
RBC # FLD: 16.2 % — HIGH (ref 11.5–14.5)
SODIUM SERPL-SCNC: 135 MMOL/L — SIGNIFICANT CHANGE UP (ref 135–146)
SP GR SPEC: 1.03 — HIGH (ref 1.01–1.02)
TROPONIN T SERPL-MCNC: <0.01 NG/ML — SIGNIFICANT CHANGE UP
UROBILINOGEN FLD QL: SIGNIFICANT CHANGE UP
VANCOMYCIN TROUGH SERPL-MCNC: 12.8 UG/ML — HIGH (ref 5–10)
VIT B12 SERPL-MCNC: 472 PG/ML — SIGNIFICANT CHANGE UP (ref 232–1245)
WBC # BLD: 7.11 K/UL — SIGNIFICANT CHANGE UP (ref 4.8–10.8)
WBC # FLD AUTO: 7.11 K/UL — SIGNIFICANT CHANGE UP (ref 4.8–10.8)

## 2020-06-18 PROCEDURE — 99233 SBSQ HOSP IP/OBS HIGH 50: CPT

## 2020-06-18 RX ORDER — INSULIN LISPRO 100/ML
8 VIAL (ML) SUBCUTANEOUS ONCE
Refills: 0 | Status: COMPLETED | OUTPATIENT
Start: 2020-06-18 | End: 2020-06-18

## 2020-06-18 RX ORDER — INSULIN LISPRO 100/ML
18 VIAL (ML) SUBCUTANEOUS
Refills: 0 | Status: DISCONTINUED | OUTPATIENT
Start: 2020-06-18 | End: 2020-06-19

## 2020-06-18 RX ORDER — OXYCODONE HYDROCHLORIDE 5 MG/1
10 TABLET ORAL EVERY 4 HOURS
Refills: 0 | Status: DISCONTINUED | OUTPATIENT
Start: 2020-06-18 | End: 2020-06-21

## 2020-06-18 RX ORDER — LANOLIN ALCOHOL/MO/W.PET/CERES
5 CREAM (GRAM) TOPICAL AT BEDTIME
Refills: 0 | Status: DISCONTINUED | OUTPATIENT
Start: 2020-06-18 | End: 2020-06-21

## 2020-06-18 RX ORDER — SENNA PLUS 8.6 MG/1
2 TABLET ORAL AT BEDTIME
Refills: 0 | Status: DISCONTINUED | OUTPATIENT
Start: 2020-06-18 | End: 2020-06-21

## 2020-06-18 RX ORDER — INSULIN LISPRO 100/ML
5 VIAL (ML) SUBCUTANEOUS ONCE
Refills: 0 | Status: COMPLETED | OUTPATIENT
Start: 2020-06-18 | End: 2020-06-19

## 2020-06-18 RX ORDER — INSULIN LISPRO 100/ML
VIAL (ML) SUBCUTANEOUS
Refills: 0 | Status: DISCONTINUED | OUTPATIENT
Start: 2020-06-18 | End: 2020-06-21

## 2020-06-18 RX ORDER — INSULIN LISPRO 100/ML
4 VIAL (ML) SUBCUTANEOUS ONCE
Refills: 0 | Status: COMPLETED | OUTPATIENT
Start: 2020-06-18 | End: 2020-06-18

## 2020-06-18 RX ADMIN — Medication 250 MILLIGRAM(S): at 21:47

## 2020-06-18 RX ADMIN — MUPIROCIN 1 APPLICATION(S): 20 OINTMENT TOPICAL at 06:05

## 2020-06-18 RX ADMIN — Medication 15 UNIT(S): at 11:25

## 2020-06-18 RX ADMIN — Medication 15 UNIT(S): at 08:05

## 2020-06-18 RX ADMIN — Medication 81 MILLIGRAM(S): at 11:27

## 2020-06-18 RX ADMIN — Medication 50 MILLIGRAM(S): at 06:03

## 2020-06-18 RX ADMIN — Medication 50 MILLIGRAM(S): at 17:09

## 2020-06-18 RX ADMIN — MUPIROCIN 1 APPLICATION(S): 20 OINTMENT TOPICAL at 17:09

## 2020-06-18 RX ADMIN — OXYCODONE HYDROCHLORIDE 10 MILLIGRAM(S): 5 TABLET ORAL at 19:44

## 2020-06-18 RX ADMIN — CHLORHEXIDINE GLUCONATE 1 APPLICATION(S): 213 SOLUTION TOPICAL at 06:07

## 2020-06-18 RX ADMIN — Medication 325 MILLIGRAM(S): at 17:09

## 2020-06-18 RX ADMIN — Medication 100 MILLIGRAM(S): at 06:03

## 2020-06-18 RX ADMIN — Medication 10 MILLIGRAM(S): at 06:06

## 2020-06-18 RX ADMIN — SENNA PLUS 2 TABLET(S): 8.6 TABLET ORAL at 21:49

## 2020-06-18 RX ADMIN — ATORVASTATIN CALCIUM 80 MILLIGRAM(S): 80 TABLET, FILM COATED ORAL at 21:49

## 2020-06-18 RX ADMIN — Medication 250 MILLIGRAM(S): at 06:06

## 2020-06-18 RX ADMIN — APIXABAN 5 MILLIGRAM(S): 2.5 TABLET, FILM COATED ORAL at 06:02

## 2020-06-18 RX ADMIN — INSULIN GLARGINE 35 UNIT(S): 100 INJECTION, SOLUTION SUBCUTANEOUS at 21:49

## 2020-06-18 RX ADMIN — Medication 325 MILLIGRAM(S): at 06:03

## 2020-06-18 RX ADMIN — OXYCODONE AND ACETAMINOPHEN 2 TABLET(S): 5; 325 TABLET ORAL at 06:03

## 2020-06-18 RX ADMIN — Medication 1 APPLICATION(S): at 17:09

## 2020-06-18 RX ADMIN — Medication 6: at 11:26

## 2020-06-18 RX ADMIN — Medication 4 UNIT(S): at 12:08

## 2020-06-18 RX ADMIN — INSULIN GLARGINE 35 UNIT(S): 100 INJECTION, SOLUTION SUBCUTANEOUS at 09:24

## 2020-06-18 RX ADMIN — Medication 18 UNIT(S): at 17:08

## 2020-06-18 RX ADMIN — Medication 1 APPLICATION(S): at 06:04

## 2020-06-18 RX ADMIN — Medication 5 MILLIGRAM(S): at 21:49

## 2020-06-18 RX ADMIN — Medication 100 MILLIGRAM(S): at 21:49

## 2020-06-18 RX ADMIN — Medication 5: at 08:05

## 2020-06-18 RX ADMIN — Medication 4: at 17:08

## 2020-06-18 RX ADMIN — DULOXETINE HYDROCHLORIDE 60 MILLIGRAM(S): 30 CAPSULE, DELAYED RELEASE ORAL at 11:27

## 2020-06-18 RX ADMIN — Medication 650 MILLIGRAM(S): at 08:27

## 2020-06-18 RX ADMIN — APIXABAN 5 MILLIGRAM(S): 2.5 TABLET, FILM COATED ORAL at 17:09

## 2020-06-18 RX ADMIN — Medication 8 UNIT(S): at 22:14

## 2020-06-18 RX ADMIN — PANTOPRAZOLE SODIUM 40 MILLIGRAM(S): 20 TABLET, DELAYED RELEASE ORAL at 06:03

## 2020-06-18 RX ADMIN — Medication 100 MILLIGRAM(S): at 14:28

## 2020-06-18 RX ADMIN — Medication 360 MILLIGRAM(S): at 06:03

## 2020-06-18 RX ADMIN — Medication 650 MILLIGRAM(S): at 21:54

## 2020-06-18 NOTE — PHYSICAL THERAPY INITIAL EVALUATION ADULT - ADDITIONAL COMMENTS
PTA: pt lives in  c IRA c HR. Has 1 flight to 2nd floor to BR c chair lift. Lives c  who assists c standing. Pt has progressively had more difficulty c ambulation tolerance. Has been sleeping downstairs on sofa couch for the past 2-3 weeks.    Pt has W/C at home and is I c W/C

## 2020-06-18 NOTE — OCCUPATIONAL THERAPY INITIAL EVALUATION ADULT - PLANNED THERAPY INTERVENTIONS, OT EVAL
balance training/ROM/IADL retraining/ADL retraining/bed mobility training/parent/caregiver training.../transfer training/strengthening/stretching

## 2020-06-18 NOTE — OCCUPATIONAL THERAPY INITIAL EVALUATION ADULT - PERTINENT HX OF CURRENT PROBLEM, REHAB EVAL
Pt is a 74 y/o female with PMH of DM, HTN, TAVR 9 months ago, Pacemaker, Asthma, b/l knee replacement and spinal fusion comes to the ED with lower leg swelling.

## 2020-06-18 NOTE — PHYSICAL THERAPY INITIAL EVALUATION ADULT - GENERAL OBSERVATIONS, REHAB EVAL
Pt laying semfowler in bed in NAD. Agreeable to PT IE. + PrimaFit. + pain to low back and BLE. RN is aware. MD notified.

## 2020-06-18 NOTE — DISCHARGE NOTE NURSING/CASE MANAGEMENT/SOCIAL WORK - PATIENT PORTAL LINK FT
You can access the FollowMyHealth Patient Portal offered by [facility] by registering at the following website: [email or web address]. By joining [facility]’s FollowMyHealth portal, you will also be able to view your health information using other applications (apps) compatible with our system.

## 2020-06-18 NOTE — OCCUPATIONAL THERAPY INITIAL EVALUATION ADULT - LIVES WITH, PROFILE
spouse/Pt lives with spouse in private home. 5 IRA and 13 stairs inside +chair lift +RW +w/c +bedside commode +walk in shower w/ 3 grab bars however pt states her  was giving her sponge baths at the side of the bed. Pt states  helps her with any and all ADLs and IADLs PTA

## 2020-06-18 NOTE — PROGRESS NOTE ADULT - ASSESSMENT
76 y/o obese, elderly female with PMH of DM, HTN, Valve replacement, Pacemaker, Asthma, b/l knee replacement and spinal fusion comes to the ED with left lower leg pain and swelling.     # Left Lower Extremity Cellulitis  - HD stable, Sepsis ruled out on admission  - WBC downtrendin <-- 13. Afebrile overnight  - Burn following- wound care as per burn, no intervention planned.   - ID agree with the diagnosis of LLE cellulitis and noninfected superficial wounds, Continue with Vancomycin 1g [id number] and local Silvadene cream  - Vascular was consulted, they recommended bilateral duplex arterial and venous studies; both of which were normal   - f/u MRSA, blood cx      # H/o TAVR  # H/o A fib?/ CHF- unknown type  - c/w eliquis, Cardizem, metoprolol, ASA  - c/w spironolactone, torsemide    # DM  - FS qc hs  - start on lantus/lispro and SSI  - Hold home med OHA  - pt has new insulin pump from 2 weeks ago- doesnt know how to use it, will use subq insulin here based on home dose.   - c/w pregabalin    # H/o HTN  - c/w home meds    # h/o Asthma - stable  - Duoneb PRN    # Morbid Obesity,   - patient counselled on importance of healthy diet and exercise, has poor exercise tolerance.   - needs outpatient follow up    DVT ppx- eliquis  GI ppx- on omeprazole at home  Ambulate as tolerated  diet- dash / carb consistent  dispo- acute, from home. lives with family. will need pt eval prior to dc 74 y/o obese, elderly female with PMH of DM, HTN, Valve replacement, Pacemaker, Asthma, b/l knee replacement and spinal fusion comes to the ED with left lower leg pain and swelling.     # Left Lower Extremity Cellulitis  - HD stable, Sepsis ruled out on admission  - WBC downtrendin <-- 13. Afebrile overnight  - Burn following- wound care as per burn, no intervention planned.   - ID agree with the diagnosis of LLE cellulitis and noninfected superficial wounds, Continue with Vancomycin 1g [id number] and local Silvadene cream  - Vascular was consulted, they recommended bilateral duplex arterial and venous studies; both of which were normal   - f/u MRSA, blood cx      # H/o TAVR  # H/o A fib?/ CHF- unknown type  - c/w eliquis, Cardizem, metoprolol, ASA  - c/w spironolactone, torsemide    # DM  - FS qc hs  - start on lantus/lispro and SSI  - Hold home med OHA  - pt has new insulin pump from 2 weeks ago- doesnt know how to use it, will use subq insulin here based on home dose.   - c/w pregabalin    # H/o HTN  - c/w home meds    # h/o Asthma - stable  - Duoneb PRN    # Morbid Obesity,   - patient counselled on importance of healthy diet and exercise, has poor exercise tolerance.   - needs outpatient follow up    The patient has asthma and bilateral lower extremities cellulitis which caused mobility limitation that significantly impairs the patients ability to participate in one or more MRADLs such as toileting, eating, dressing, and bathing in customary locations in home. the pts home provides adequate access between the rooms for the use of bariatric manual wheelchair. the wheelchair will significantly improve the pt ability to participate in MDARLs and will be used on a regular basis in the home. the pts mobility limitations cannot be resolved with the use of cane or walker. the pts has sufficient upper extremities function to safely propel the bariatric manual wheelchair in the home during typical day. The pt has agreed to use the bariatric manual wheelchair that is provided in the home. this patient also requires a bariatric commode to use in the home due to decreased endurance secondary to bilateral lower extremities cellulitis and asthma    DVT ppx- eliquis  GI ppx- on omeprazole at home  Ambulate as tolerated  diet- dash / carb consistent  dispo- acute, from home. lives with family. will need pt eval prior to dc

## 2020-06-18 NOTE — PROGRESS NOTE ADULT - ASSESSMENT
Assessment and Recommendation:   · Assessment		  76 y/o obese, elderly female with PMH of DM, HTN, Valve replacement, Pacemaker, Asthma, b/l knee replacement and spinal fusion comes to the ED with left lower leg pain and swelling.     IMPRESSION;   LLE cellulitis : resolving  Superficial non infected wounds    RECOMMENDATIONS;   vancomycin 1 gm iv q12h  local silverdene cream  will change to to ABx 6/19

## 2020-06-18 NOTE — PROGRESS NOTE ADULT - SUBJECTIVE AND OBJECTIVE BOX
JNLAUREN  75y, Female    All available historical data reviewed    OVERNIGHT EVENTS:  no fevers  pain LLE less    ROS:  General: Denies rigors, night sweats  HEENT: Denies headache, rhinorrhea, sore throat, eye pain  CV: Denies CP, palpitations  PULM: Denies wheezing, hemoptysis  GI: Denies hematemesis, hematochezia, melena  : Denies discharge, hematuria  MSK: Denies arthralgias, myalgias  SKIN: Denies rash, lesions  NEURO: Denies paresthesias, weakness  PSYCH: Denies depression, anxiety    VITALS:  T(F): 96.4, Max: 98 (06-17-20 @ 17:43)  HR: 73  BP: 148/64  RR: 22Vital Signs Last 24 Hrs  T(C): 35.8 (17 Jun 2020 20:42), Max: 36.7 (17 Jun 2020 17:43)  T(F): 96.4 (17 Jun 2020 20:42), Max: 98 (17 Jun 2020 17:43)  HR: 73 (17 Jun 2020 20:42) (68 - 73)  BP: 148/64 (17 Jun 2020 20:42) (142/63 - 148/64)  BP(mean): 92 (17 Jun 2020 20:42) (92 - 92)  RR: 22 (17 Jun 2020 20:42) (20 - 22)  SpO2: 97% (18 Jun 2020 08:39) (97% - 97%)    TESTS & MEASUREMENTS:                        9.6    7.11  )-----------( 180      ( 18 Jun 2020 06:33 )             29.4     06-18    135  |  95<L>  |  19  ----------------------------<  376<H>  4.9   |  27  |  0.9    Ca    8.8      18 Jun 2020 06:33  Phos  3.8     06-16  Mg     1.8     06-18    TPro  5.4<L>  /  Alb  3.4<L>  /  TBili  0.2  /  DBili  x   /  AST  11  /  ALT  18  /  AlkPhos  74  06-18    LIVER FUNCTIONS - ( 18 Jun 2020 06:33 )  Alb: 3.4 g/dL / Pro: 5.4 g/dL / ALK PHOS: 74 U/L / ALT: 18 U/L / AST: 11 U/L / GGT: x                   RADIOLOGY & ADDITIONAL TESTS:  Personal review of radiological diagnostics performed  Echo and EKG results noted when applicable.     MEDICATIONS:  acetaminophen   Tablet .. 650 milliGRAM(s) Oral every 6 hours PRN  apixaban 5 milliGRAM(s) Oral every 12 hours  aspirin  chewable 81 milliGRAM(s) Oral daily  atorvastatin 80 milliGRAM(s) Oral at bedtime  chlorhexidine 4% Liquid 1 Application(s) Topical <User Schedule>  dextrose 40% Gel 15 Gram(s) Oral once PRN  dextrose 5%. 1000 milliLiter(s) IV Continuous <Continuous>  dextrose 50% Injectable 12.5 Gram(s) IV Push once  dextrose 50% Injectable 25 Gram(s) IV Push once  dextrose 50% Injectable 25 Gram(s) IV Push once  diltiazem    milliGRAM(s) Oral daily  DULoxetine 60 milliGRAM(s) Oral daily  ferrous    sulfate 325 milliGRAM(s) Oral two times a day  glucagon  Injectable 1 milliGRAM(s) IntraMuscular once PRN  insulin glargine Injectable (LANTUS) 35 Unit(s) SubCutaneous two times a day  insulin lispro (HumaLOG) corrective regimen sliding scale   SubCutaneous three times a day before meals  insulin lispro Injectable (HumaLOG) 15 Unit(s) SubCutaneous three times a day before meals  metoprolol tartrate 50 milliGRAM(s) Oral two times a day  mupirocin 2% Ointment 1 Application(s) Topical every 12 hours  oxycodone    5 mG/acetaminophen 325 mG 2 Tablet(s) Oral every 6 hours PRN  pantoprazole    Tablet 40 milliGRAM(s) Oral before breakfast  pregabalin 100 milliGRAM(s) Oral three times a day  silver sulfADIAZINE 1% Cream 1 Application(s) Topical two times a day  spironolactone 25 milliGRAM(s) Oral <User Schedule>  torsemide 10 milliGRAM(s) Oral daily  vancomycin  IVPB 1000 milliGRAM(s) IV Intermittent every 12 hours      ANTIBIOTICS:  vancomycin  IVPB 1000 milliGRAM(s) IV Intermittent every 12 hours

## 2020-06-18 NOTE — PHYSICAL THERAPY INITIAL EVALUATION ADULT - PLANNED THERAPY INTERVENTIONS, PT EVAL
transfer training/wheelchair management/propulsion training/neuromuscular re-education/bed mobility training/lumbar stabilization/postural re-education/ROM/strengthening/balance training/gait training/stretching

## 2020-06-18 NOTE — PROGRESS NOTE ADULT - SUBJECTIVE AND OBJECTIVE BOX
(24H events:    Patient is a 75y old Female who presents with a chief complaint of Cellulitis (17 Jun 2020 14:16)    Primary diagnosis of Cellulitis of lower extremity     Today is hospital day 2d.     PAST MEDICAL & SURGICAL HISTORY  Pacemaker  Asthma  HTN (hypertension)  DM (diabetes mellitus)    SOCIAL HISTORY:  Negative for smoking/alcohol/drug use.     ALLERGIES:  No Known Allergies    MEDICATIONS:  STANDING MEDICATIONS  apixaban 5 milliGRAM(s) Oral every 12 hours  aspirin  chewable 81 milliGRAM(s) Oral daily  atorvastatin 80 milliGRAM(s) Oral at bedtime  chlorhexidine 4% Liquid 1 Application(s) Topical <User Schedule>  dextrose 5%. 1000 milliLiter(s) IV Continuous <Continuous>  dextrose 50% Injectable 12.5 Gram(s) IV Push once  dextrose 50% Injectable 25 Gram(s) IV Push once  dextrose 50% Injectable 25 Gram(s) IV Push once  diltiazem    milliGRAM(s) Oral daily  DULoxetine 60 milliGRAM(s) Oral daily  ferrous    sulfate 325 milliGRAM(s) Oral two times a day  insulin glargine Injectable (LANTUS) 35 Unit(s) SubCutaneous two times a day  insulin lispro (HumaLOG) corrective regimen sliding scale   SubCutaneous three times a day before meals  insulin lispro Injectable (HumaLOG) 15 Unit(s) SubCutaneous three times a day before meals  metoprolol tartrate 50 milliGRAM(s) Oral two times a day  mupirocin 2% Ointment 1 Application(s) Topical every 12 hours  pantoprazole    Tablet 40 milliGRAM(s) Oral before breakfast  pregabalin 100 milliGRAM(s) Oral three times a day  silver sulfADIAZINE 1% Cream 1 Application(s) Topical two times a day  spironolactone 25 milliGRAM(s) Oral <User Schedule>  torsemide 10 milliGRAM(s) Oral daily  vancomycin  IVPB 1000 milliGRAM(s) IV Intermittent every 12 hours    PRN MEDICATIONS  acetaminophen   Tablet .. 650 milliGRAM(s) Oral every 6 hours PRN  dextrose 40% Gel 15 Gram(s) Oral once PRN  glucagon  Injectable 1 milliGRAM(s) IntraMuscular once PRN  oxycodone    5 mG/acetaminophen 325 mG 2 Tablet(s) Oral every 6 hours PRN    VITALS:   T(F): 96.4  HR: 73  BP: 148/64  RR: 22  SpO2: 97%    LABS:                        9.6    7.11  )-----------( 180      ( 18 Jun 2020 06:33 )             29.4     06-18    135  |  95<L>  |  19  ----------------------------<  376<H>  4.9   |  27  |  0.9    Ca    8.8      18 Jun 2020 06:33  Phos  3.8     06-16  Mg     1.8     06-18    TPro  5.4<L>  /  Alb  3.4<L>  /  TBili  0.2  /  DBili  x   /  AST  11  /  ALT  18  /  AlkPhos  74  06-18    PT/INR - ( 16 Jun 2020 18:00 )   PT: 14.00 sec;   INR: 1.22 ratio         PTT - ( 16 Jun 2020 18:00 )  PTT:32.1 sec    ABG - ( 17 Jun 2020 09:26 )  pH, Arterial: 7.40  pH, Blood: x     /  pCO2: 51    /  pO2: 162   / HCO3: 32    / Base Excess: 5.9   /  SaO2: 98                Troponin T, Serum: <0.01 ng/mL (06-18-20 @ 06:33)  Troponin T, Serum: <0.01 ng/mL (06-17-20 @ 21:12)  Troponin T, Serum: <0.01 ng/mL (06-17-20 @ 16:20)      CARDIAC MARKERS ( 18 Jun 2020 06:33 )  x     / <0.01 ng/mL / x     / x     / x      CARDIAC MARKERS ( 17 Jun 2020 21:12 )  x     / <0.01 ng/mL / x     / x     / x      CARDIAC MARKERS ( 17 Jun 2020 16:20 )  x     / <0.01 ng/mL / x     / x     / x            PHYSICAL EXAM:  GENERAL: Obese, elderly F in NAD, speaks in full sentences, no signs of respiratory distress  CHEST/LUNG: Clear to auscultation bilaterally; No wheeze or crackles  HEART: S1, S2; RRR; No murmurs, rubs, or gallops  ABDOMEN: Obese, BS+; Soft, Non-tender, Non-distended  EXTREMITIES:  2+ Peripheral Pulses, No clubbing, cyanosis, or edema. Erythema on left lower extremity along with tenderness to light palpation, and 2+ edema on LLE.

## 2020-06-18 NOTE — PHYSICAL THERAPY INITIAL EVALUATION ADULT - PERTINENT HX OF CURRENT PROBLEM, REHAB EVAL
Pt presents to [facility] c 6 m h/o progressively worsening BLE pain and weakness c + cellulitis. 74 y/o obese female with PMH of DM, HTN, Valve replacement, Pacemaker, Asthma, b/l knee replacement and spinal fusion comes to the ED with left lower leg pain and swelling.

## 2020-06-18 NOTE — PHYSICAL THERAPY INITIAL EVALUATION ADULT - CRITERIA FOR SKILLED THERAPEUTIC INTERVENTIONS
therapy frequency/predicted duration of therapy intervention/anticipated equipment needs at discharge/functional limitations in following categories/rehab potential/impairments found/anticipated discharge recommendation

## 2020-06-19 LAB
ALBUMIN SERPL ELPH-MCNC: 3.4 G/DL — LOW (ref 3.5–5.2)
ALP SERPL-CCNC: 72 U/L — SIGNIFICANT CHANGE UP (ref 30–115)
ALT FLD-CCNC: 16 U/L — SIGNIFICANT CHANGE UP (ref 0–41)
ANION GAP SERPL CALC-SCNC: 12 MMOL/L — SIGNIFICANT CHANGE UP (ref 7–14)
AST SERPL-CCNC: 10 U/L — SIGNIFICANT CHANGE UP (ref 0–41)
BILIRUB SERPL-MCNC: <0.2 MG/DL — SIGNIFICANT CHANGE UP (ref 0.2–1.2)
BUN SERPL-MCNC: 18 MG/DL — SIGNIFICANT CHANGE UP (ref 10–20)
CALCIUM SERPL-MCNC: 8.7 MG/DL — SIGNIFICANT CHANGE UP (ref 8.5–10.1)
CHLORIDE SERPL-SCNC: 94 MMOL/L — LOW (ref 98–110)
CO2 SERPL-SCNC: 28 MMOL/L — SIGNIFICANT CHANGE UP (ref 17–32)
CREAT SERPL-MCNC: 0.9 MG/DL — SIGNIFICANT CHANGE UP (ref 0.7–1.5)
GLUCOSE BLDC GLUCOMTR-MCNC: 222 MG/DL — HIGH (ref 70–99)
GLUCOSE BLDC GLUCOMTR-MCNC: 222 MG/DL — HIGH (ref 70–99)
GLUCOSE BLDC GLUCOMTR-MCNC: 310 MG/DL — HIGH (ref 70–99)
GLUCOSE BLDC GLUCOMTR-MCNC: 382 MG/DL — HIGH (ref 70–99)
GLUCOSE BLDC GLUCOMTR-MCNC: 390 MG/DL — HIGH (ref 70–99)
GLUCOSE BLDC GLUCOMTR-MCNC: 434 MG/DL — HIGH (ref 70–99)
GLUCOSE SERPL-MCNC: 221 MG/DL — HIGH (ref 70–99)
HCT VFR BLD CALC: 28.8 % — LOW (ref 37–47)
HGB BLD-MCNC: 9.3 G/DL — LOW (ref 12–16)
MCHC RBC-ENTMCNC: 29.8 PG — SIGNIFICANT CHANGE UP (ref 27–31)
MCHC RBC-ENTMCNC: 32.3 G/DL — SIGNIFICANT CHANGE UP (ref 32–37)
MCV RBC AUTO: 92.3 FL — SIGNIFICANT CHANGE UP (ref 81–99)
NRBC # BLD: 0 /100 WBCS — SIGNIFICANT CHANGE UP (ref 0–0)
PLATELET # BLD AUTO: 176 K/UL — SIGNIFICANT CHANGE UP (ref 130–400)
POTASSIUM SERPL-MCNC: 4.4 MMOL/L — SIGNIFICANT CHANGE UP (ref 3.5–5)
POTASSIUM SERPL-SCNC: 4.4 MMOL/L — SIGNIFICANT CHANGE UP (ref 3.5–5)
PROT SERPL-MCNC: 5.4 G/DL — LOW (ref 6–8)
RBC # BLD: 3.12 M/UL — LOW (ref 4.2–5.4)
RBC # FLD: 15.9 % — HIGH (ref 11.5–14.5)
SODIUM SERPL-SCNC: 134 MMOL/L — LOW (ref 135–146)
WBC # BLD: 8.28 K/UL — SIGNIFICANT CHANGE UP (ref 4.8–10.8)
WBC # FLD AUTO: 8.28 K/UL — SIGNIFICANT CHANGE UP (ref 4.8–10.8)

## 2020-06-19 PROCEDURE — 99232 SBSQ HOSP IP/OBS MODERATE 35: CPT

## 2020-06-19 RX ORDER — INSULIN LISPRO 100/ML
20 VIAL (ML) SUBCUTANEOUS
Refills: 0 | Status: DISCONTINUED | OUTPATIENT
Start: 2020-06-19 | End: 2020-06-20

## 2020-06-19 RX ORDER — INSULIN GLARGINE 100 [IU]/ML
40 INJECTION, SOLUTION SUBCUTANEOUS
Refills: 0 | Status: DISCONTINUED | OUTPATIENT
Start: 2020-06-19 | End: 2020-06-20

## 2020-06-19 RX ORDER — INSULIN LISPRO 100/ML
15 VIAL (ML) SUBCUTANEOUS ONCE
Refills: 0 | Status: COMPLETED | OUTPATIENT
Start: 2020-06-19 | End: 2020-06-19

## 2020-06-19 RX ADMIN — SENNA PLUS 2 TABLET(S): 8.6 TABLET ORAL at 21:25

## 2020-06-19 RX ADMIN — Medication 20 UNIT(S): at 15:48

## 2020-06-19 RX ADMIN — Medication 650 MILLIGRAM(S): at 17:16

## 2020-06-19 RX ADMIN — Medication 1 APPLICATION(S): at 15:48

## 2020-06-19 RX ADMIN — Medication 5 MILLIGRAM(S): at 21:25

## 2020-06-19 RX ADMIN — Medication 81 MILLIGRAM(S): at 10:38

## 2020-06-19 RX ADMIN — Medication 360 MILLIGRAM(S): at 06:08

## 2020-06-19 RX ADMIN — DULOXETINE HYDROCHLORIDE 60 MILLIGRAM(S): 30 CAPSULE, DELAYED RELEASE ORAL at 12:14

## 2020-06-19 RX ADMIN — OXYCODONE HYDROCHLORIDE 10 MILLIGRAM(S): 5 TABLET ORAL at 17:17

## 2020-06-19 RX ADMIN — INSULIN GLARGINE 40 UNIT(S): 100 INJECTION, SOLUTION SUBCUTANEOUS at 21:31

## 2020-06-19 RX ADMIN — Medication 18 UNIT(S): at 08:03

## 2020-06-19 RX ADMIN — SPIRONOLACTONE 25 MILLIGRAM(S): 25 TABLET, FILM COATED ORAL at 08:10

## 2020-06-19 RX ADMIN — Medication 325 MILLIGRAM(S): at 15:49

## 2020-06-19 RX ADMIN — Medication 100 MILLIGRAM(S): at 15:49

## 2020-06-19 RX ADMIN — Medication 10 MILLIGRAM(S): at 06:13

## 2020-06-19 RX ADMIN — OXYCODONE HYDROCHLORIDE 10 MILLIGRAM(S): 5 TABLET ORAL at 21:25

## 2020-06-19 RX ADMIN — Medication 50 MILLIGRAM(S): at 17:13

## 2020-06-19 RX ADMIN — Medication 2: at 08:02

## 2020-06-19 RX ADMIN — Medication 20 UNIT(S): at 11:47

## 2020-06-19 RX ADMIN — Medication 50 MILLIGRAM(S): at 06:07

## 2020-06-19 RX ADMIN — MUPIROCIN 1 APPLICATION(S): 20 OINTMENT TOPICAL at 06:13

## 2020-06-19 RX ADMIN — CHLORHEXIDINE GLUCONATE 1 APPLICATION(S): 213 SOLUTION TOPICAL at 06:05

## 2020-06-19 RX ADMIN — Medication 5 UNIT(S): at 00:08

## 2020-06-19 RX ADMIN — Medication 100 MILLIGRAM(S): at 12:14

## 2020-06-19 RX ADMIN — INSULIN GLARGINE 35 UNIT(S): 100 INJECTION, SOLUTION SUBCUTANEOUS at 08:11

## 2020-06-19 RX ADMIN — Medication 100 MILLIGRAM(S): at 06:12

## 2020-06-19 RX ADMIN — Medication 6: at 15:49

## 2020-06-19 RX ADMIN — ATORVASTATIN CALCIUM 80 MILLIGRAM(S): 80 TABLET, FILM COATED ORAL at 21:25

## 2020-06-19 RX ADMIN — Medication 15 UNIT(S): at 21:38

## 2020-06-19 RX ADMIN — Medication 325 MILLIGRAM(S): at 06:08

## 2020-06-19 RX ADMIN — APIXABAN 5 MILLIGRAM(S): 2.5 TABLET, FILM COATED ORAL at 15:49

## 2020-06-19 RX ADMIN — APIXABAN 5 MILLIGRAM(S): 2.5 TABLET, FILM COATED ORAL at 06:07

## 2020-06-19 RX ADMIN — Medication 1 APPLICATION(S): at 06:12

## 2020-06-19 RX ADMIN — Medication 2: at 11:46

## 2020-06-19 RX ADMIN — Medication 250 MILLIGRAM(S): at 08:10

## 2020-06-19 RX ADMIN — MUPIROCIN 1 APPLICATION(S): 20 OINTMENT TOPICAL at 15:48

## 2020-06-19 RX ADMIN — Medication 100 MILLIGRAM(S): at 21:25

## 2020-06-19 RX ADMIN — PANTOPRAZOLE SODIUM 40 MILLIGRAM(S): 20 TABLET, DELAYED RELEASE ORAL at 06:07

## 2020-06-19 NOTE — PROGRESS NOTE ADULT - ASSESSMENT
· Assessment		  74 y/o obese, elderly female with PMH of DM, HTN, Valve replacement, Pacemaker, Asthma, b/l knee replacement and spinal fusion comes to the ED with left lower leg pain and swelling.     IMPRESSION;   LLE cellulitis : resolving  Superficial non infected wounds    RECOMMENDATIONS;   po Doxycycline 100 mg q12h for 8 more days  d/c vanco  local silverdene cream  recall prn please

## 2020-06-19 NOTE — PROGRESS NOTE ADULT - ASSESSMENT
76 y/o obese, elderly female with PMH of DM, HTN, Valve replacement, Pacemaker, Asthma, b/l knee replacement and spinal fusion comes to the ED with left lower leg pain and swelling.     # Left Lower Extremity Cellulitis  - HD stable, Sepsis ruled out on admission  - WBC downtrendin <-- 13. Afebrile overnight  - Burn following- wound care as per burn, no intervention planned.   - ID agree with the diagnosis of LLE cellulitis and noninfected superficial wounds, Continue with Vancomycin 1g [id number] and local Silvadene cream  - Vascular was consulted, they recommended bilateral duplex arterial and venous studies; both of which were normal   - f/u MRSA, blood cx      # H/o TAVR  # H/o A fib?/ CHF- unknown type  - c/w eliquis, Cardizem, metoprolol, ASA  - c/w spironolactone, torsemide    # DM  - FS qc hs  - start on lantus/lispro and SSI  - Hold home med OHA  - pt has new insulin pump from 2 weeks ago- doesnt know how to use it, will use subq insulin here based on home dose.   - c/w pregabalin    # H/o HTN  - c/w home meds    # h/o Asthma - stable  - Duoneb PRN    # Morbid Obesity,   - patient counselled on importance of healthy diet and exercise, has poor exercise tolerance.   - needs outpatient follow up    The patient has asthma and bilateral lower extremities cellulitis which caused mobility limitation that significantly impairs the patients ability to participate in one or more MRADLs such as toileting, eating, dressing, and bathing in customary locations in home. the pts home provides adequate access between the rooms for the use of bariatric manual wheelchair. the wheelchair will significantly improve the pt ability to participate in MDARLs and will be used on a regular basis in the home. the pts mobility limitations cannot be resolved with the use of cane or walker. the pts has sufficient upper extremities function to safely propel the bariatric manual wheelchair in the home during typical day. The pt has agreed to use the bariatric manual wheelchair that is provided in the home. this patient also requires a bariatric commode to use in the home due to decreased endurance secondary to bilateral lower extremities cellulitis and asthma    DVT ppx- eliquis  GI ppx- on omeprazole at home  Ambulate as tolerated  diet- dash / carb consistent  dispo- acute, from home. lives with family. will need pt eval prior to dc 76 y/o obese, elderly female with PMH of DM, HTN, Valve replacement, Pacemaker, Asthma, b/l knee replacement and spinal fusion comes to the ED with left lower leg pain and swelling.     # Left Lower Extremity Cellulitis  - HD stable, Sepsis ruled out on admission  - WBC downtrendin <-- 13. Afebrile overnight  - Burn following- wound care as per burn, no intervention planned.   - ID agree with the diagnosis of LLE cellulitis and noninfected superficial wounds, Continue with Vancomycin 1g [id number] and local Silvadene cream  - Vascular was consulted, they recommended bilateral duplex arterial and venous studies; both of which were normal   - f/u MRSA, blood cx    # H/o TAVR  # H/o A fib?/ CHF- unknown type  - c/w eliquis, Cardizem, metoprolol, ASA  - c/w spironolactone, torsemide    # DM  - FS qc hs  - start on lantus/lispro and SSI  - Hold home med OHA  - pt has new insulin pump from 2 weeks ago- doesnt know how to use it, will use subq insulin here based on home dose.   - c/w pregabalin    # H/o HTN  - c/w home meds    # h/o Asthma - stable  - Duoneb PRN    # Morbid Obesity,   - patient counselled on importance of healthy diet and exercise, has poor exercise tolerance.   - needs outpatient follow up    The patient has asthma and bilateral lower extremities cellulitis which caused mobility limitation that significantly impairs the patients ability to participate in one or more MRADLs such as toileting, eating, dressing, and bathing in customary locations in home. the pts home provides adequate access between the rooms for the use of bariatric manual wheelchair. the wheelchair will significantly improve the pt ability to participate in MDARLs and will be used on a regular basis in the home. the pts mobility limitations cannot be resolved with the use of cane or walker. the pts has sufficient upper extremities function to safely propel the bariatric manual wheelchair in the home during typical day. The pt has agreed to use the bariatric manual wheelchair that is provided in the home. this patient also requires a bariatric commode to use in the home due to decreased endurance secondary to bilateral lower extremities cellulitis and asthma    DVT ppx- eliquis  GI ppx- on omeprazole at home  Ambulate as tolerated  diet- dash / carb consistent  dispo- acute, from home. lives with family. will need pt eval prior to dc 76 y/o obese, elderly female with PMH of DM, HTN, Valve replacement, Pacemaker, Asthma, b/l knee replacement and spinal fusion comes to the ED with left lower leg pain and swelling.     # Left Lower Extremity Cellulitis  - HD stable, Sepsis ruled out on admission  - WBC downtrendin <-- 13. Afebrile overnight  - Burn following- wound care as per burn, no intervention planned.   - ID agree with the diagnosis of LLE cellulitis and noninfected superficial wounds, Continue with Vancomycin 1g [id number] and local Silvadene cream  - Vascular was consulted, they recommended bilateral duplex arterial and venous studies; both of which were normal   - f/u MRSA, blood cx    #MRSA nares  -Mupirocin local   -No need for isolation for that     # H/o TAVR  # H/o A fib?/ CHF- unknown type  - c/w eliquis, Cardizem, metoprolol, ASA  - c/w spironolactone, torsemide    # DM  - FS qc hs  - start on lantus/lispro and SSI  - Hold home med OHA  - pt has new insulin pump from 2 weeks ago- doesnt know how to use it, will use subq insulin here based on home dose.   - c/w pregabalin    # H/o HTN  - c/w home meds    # h/o Asthma - stable  - Duoneb PRN    # Morbid Obesity,   - patient counselled on importance of healthy diet and exercise, has poor exercise tolerance.   - needs outpatient follow up    The patient has asthma and bilateral lower extremities cellulitis which caused mobility limitation that significantly impairs the patients ability to participate in one or more MRADLs such as toileting, eating, dressing, and bathing in customary locations in home. the pts home provides adequate access between the rooms for the use of bariatric manual wheelchair. the wheelchair will significantly improve the pt ability to participate in MDARLs and will be used on a regular basis in the home. the pts mobility limitations cannot be resolved with the use of cane or walker. the pts has sufficient upper extremities function to safely propel the bariatric manual wheelchair in the home during typical day. The pt has agreed to use the bariatric manual wheelchair that is provided in the home. this patient also requires a bariatric commode to use in the home due to decreased endurance secondary to bilateral lower extremities cellulitis and asthma    DVT ppx- eliquis  GI ppx- on omeprazole at home  Ambulate as tolerated  diet- dash / carb consistent  dispo- acute, from home. lives with family. will need pt eval prior to dc

## 2020-06-19 NOTE — PROGRESS NOTE ADULT - SUBJECTIVE AND OBJECTIVE BOX
24H events:    Patient is a 75y old Female who presents with a chief complaint of Cellulitis (2020 12:48)    Primary diagnosis of Cellulitis of lower extremity     Today is hospital day 3d.     PAST MEDICAL & SURGICAL HISTORY  Pacemaker  Asthma  HTN (hypertension)  DM (diabetes mellitus)    SOCIAL HISTORY:  Negative for smoking/alcohol/drug use.     ALLERGIES:  No Known Allergies    MEDICATIONS:  STANDING MEDICATIONS  apixaban 5 milliGRAM(s) Oral every 12 hours  aspirin  chewable 81 milliGRAM(s) Oral daily  atorvastatin 80 milliGRAM(s) Oral at bedtime  chlorhexidine 4% Liquid 1 Application(s) Topical <User Schedule>  dextrose 5%. 1000 milliLiter(s) IV Continuous <Continuous>  dextrose 50% Injectable 12.5 Gram(s) IV Push once  dextrose 50% Injectable 25 Gram(s) IV Push once  dextrose 50% Injectable 25 Gram(s) IV Push once  diltiazem    milliGRAM(s) Oral daily  DULoxetine 60 milliGRAM(s) Oral daily  ferrous    sulfate 325 milliGRAM(s) Oral two times a day  insulin glargine Injectable (LANTUS) 40 Unit(s) SubCutaneous two times a day  insulin lispro (HumaLOG) corrective regimen sliding scale   SubCutaneous three times a day before meals  insulin lispro Injectable (HumaLOG) 20 Unit(s) SubCutaneous three times a day before meals  melatonin 5 milliGRAM(s) Oral at bedtime  metoprolol tartrate 50 milliGRAM(s) Oral two times a day  mupirocin 2% Ointment 1 Application(s) Topical every 12 hours  pantoprazole    Tablet 40 milliGRAM(s) Oral before breakfast  pregabalin 100 milliGRAM(s) Oral three times a day  senna 2 Tablet(s) Oral at bedtime  silver sulfADIAZINE 1% Cream 1 Application(s) Topical two times a day  spironolactone 25 milliGRAM(s) Oral <User Schedule>  torsemide 10 milliGRAM(s) Oral daily  vancomycin  IVPB 1000 milliGRAM(s) IV Intermittent every 12 hours    PRN MEDICATIONS  acetaminophen   Tablet .. 650 milliGRAM(s) Oral every 6 hours PRN  dextrose 40% Gel 15 Gram(s) Oral once PRN  glucagon  Injectable 1 milliGRAM(s) IntraMuscular once PRN  oxyCODONE    IR 10 milliGRAM(s) Oral every 4 hours PRN    VITALS:   T(F): 97.9  HR: 64  BP: 159/70  RR: 18  SpO2: 95%    LABS:                        9.3    8.28  )-----------( 176      ( 2020 04:56 )             28.8     06-19    134<L>  |  94<L>  |  18  ----------------------------<  221<H>  4.4   |  28  |  0.9    Ca    8.7      2020 04:56  Mg     1.8     18    TPro  5.4<L>  /  Alb  3.4<L>  /  TBili  <0.2  /  DBili  x   /  AST  10  /  ALT  16  /  AlkPhos  72  -      Urinalysis Basic - ( 2020 21:10 )    Color: Yellow / Appearance: Clear / S.028 / pH: x  Gluc: x / Ketone: Negative  / Bili: Negative / Urobili: <2 mg/dL   Blood: x / Protein: Trace / Nitrite: Negative   Leuk Esterase: Negative / RBC: x / WBC x   Sq Epi: x / Non Sq Epi: x / Bacteria: x            Culture - Blood (collected 2020 05:34)  Source: .Blood None  Preliminary Report (2020 13:01):    No growth to date.      CARDIAC MARKERS ( 2020 06:33 )  x     / <0.01 ng/mL / x     / x     / x      CARDIAC MARKERS ( 2020 21:12 )  x     / <0.01 ng/mL / x     / x     / x      CARDIAC MARKERS ( 2020 16:20 )  x     / <0.01 ng/mL / x     / x     / x          PHYSICAL EXAM:  GENERAL: Obese, elderly F in NAD, speaks in full sentences, no signs of respiratory distress  CHEST/LUNG: Clear to auscultation bilaterally; No wheeze or crackles  HEART: S1, S2; RRR; No murmurs, rubs, or gallops  ABDOMEN: Obese, BS+; Soft, Non-tender, Non-distended  EXTREMITIES:  2+ Peripheral Pulses, No clubbing, cyanosis, or edema. Erythema on left lower extremity along with tenderness to light palpation, and 2+ edema on LLE. 24H events:    Patient is a 75y old Female who presents with a chief complaint of Cellulitis (2020 12:48)    Primary diagnosis of Cellulitis of lower extremity     Today is hospital day 3d.     PAST MEDICAL & SURGICAL HISTORY  Pacemaker  Asthma  HTN (hypertension)  DM (diabetes mellitus)    SOCIAL HISTORY:  Negative for smoking/alcohol/drug use.     ALLERGIES:  No Known Allergies    MEDICATIONS:  STANDING MEDICATIONS  apixaban 5 milliGRAM(s) Oral every 12 hours  aspirin  chewable 81 milliGRAM(s) Oral daily  atorvastatin 80 milliGRAM(s) Oral at bedtime  chlorhexidine 4% Liquid 1 Application(s) Topical <User Schedule>  dextrose 5%. 1000 milliLiter(s) IV Continuous <Continuous>  dextrose 50% Injectable 12.5 Gram(s) IV Push once  dextrose 50% Injectable 25 Gram(s) IV Push once  dextrose 50% Injectable 25 Gram(s) IV Push once  diltiazem    milliGRAM(s) Oral daily  DULoxetine 60 milliGRAM(s) Oral daily  ferrous    sulfate 325 milliGRAM(s) Oral two times a day  insulin glargine Injectable (LANTUS) 40 Unit(s) SubCutaneous two times a day  insulin lispro (HumaLOG) corrective regimen sliding scale   SubCutaneous three times a day before meals  insulin lispro Injectable (HumaLOG) 20 Unit(s) SubCutaneous three times a day before meals  melatonin 5 milliGRAM(s) Oral at bedtime  metoprolol tartrate 50 milliGRAM(s) Oral two times a day  mupirocin 2% Ointment 1 Application(s) Topical every 12 hours  pantoprazole    Tablet 40 milliGRAM(s) Oral before breakfast  pregabalin 100 milliGRAM(s) Oral three times a day  senna 2 Tablet(s) Oral at bedtime  silver sulfADIAZINE 1% Cream 1 Application(s) Topical two times a day  spironolactone 25 milliGRAM(s) Oral <User Schedule>  torsemide 10 milliGRAM(s) Oral daily  vancomycin  IVPB 1000 milliGRAM(s) IV Intermittent every 12 hours    PRN MEDICATIONS  acetaminophen   Tablet .. 650 milliGRAM(s) Oral every 6 hours PRN  dextrose 40% Gel 15 Gram(s) Oral once PRN  glucagon  Injectable 1 milliGRAM(s) IntraMuscular once PRN  oxyCODONE    IR 10 milliGRAM(s) Oral every 4 hours PRN    VITALS:   T(F): 97.9  HR: 64  BP: 159/70  RR: 18  SpO2: 95%    LABS:                        9.3    8.28  )-----------( 176      ( 2020 04:56 )             28.8     134<L>  |  94<L>  |  18  ----------------------------<  221<H>  4.4   |  28  |  0.9    Ca    8.7      2020 04:56  Mg     1.8     06-18    TPro  5.4<L>  /  Alb  3.4<L>  /  TBili  <0.2  /  DBili  x   /  AST  10  /  ALT  16  /  AlkPhos  72  06-19  Urinalysis Basic - ( 2020 21:10 )    Color: Yellow / Appearance: Clear / S.028 / pH: x  Gluc: x / Ketone: Negative  / Bili: Negative / Urobili: <2 mg/dL   Blood: x / Protein: Trace / Nitrite: Negative   Leuk Esterase: Negative / RBC: x / WBC x   Sq Epi: x / Non Sq Epi: x / Bacteria: x    Culture - Blood (collected 2020 05:34)  Source: .Blood None  Preliminary Report (2020 13:01):    No growth to date.  CARDIAC MARKERS ( 2020 06:33 )  x     / <0.01 ng/mL / x     / x     / x      CARDIAC MARKERS ( 2020 21:12 )  x     / <0.01 ng/mL / x     / x     / x      CARDIAC MARKERS ( 2020 16:20 )  x     / <0.01 ng/mL / x     / x     / x          PHYSICAL EXAM:  GENERAL: Obese, elderly F in NAD, speaks in full sentences, no signs of respiratory distress  CHEST/LUNG: Clear to auscultation bilaterally; No wheeze or crackles  HEART: S1, S2; RRR; No murmurs, rubs, or gallops  ABDOMEN: Obese, BS+; Soft, Non-tender, Non-distended  EXTREMITIES:  2+ Peripheral Pulses, No clubbing, cyanosis, or edema. Erythema on left lower extremity along with tenderness to light palpation, and 2+ edema on LLE.

## 2020-06-19 NOTE — PROGRESS NOTE ADULT - SUBJECTIVE AND OBJECTIVE BOX
JNLAUREN  75y, Female    All available historical data reviewed    OVERNIGHT EVENTS:  no fevers      ROS:  General: Denies rigors, night sweats  HEENT: Denies headache, rhinorrhea, sore throat, eye pain  CV: Denies CP, palpitations  PULM: Denies wheezing, hemoptysis  GI: Denies hematemesis, hematochezia, melena  : Denies discharge, hematuria  MSK: Denies arthralgias, myalgias  SKIN: Denies rash, lesions  NEURO: Denies paresthesias, weakness  PSYCH: Denies depression, anxiety    VITALS:  T(F): 97.5, Max: 98.5 (- @ 21:09)  HR: 67  BP: 126/58  RR: 19Vital Signs Last 24 Hrs  T(C): 36.4 (2020 12:48), Max: 36.9 (2020 21:09)  T(F): 97.5 (2020 12:48), Max: 98.5 (2020 21:09)  HR: 67 (2020 12:48) (64 - 67)  BP: 126/58 (2020 12:48) (126/58 - 159/70)  BP(mean): --  RR: 19 (2020 12:48) (18 - 19)  SpO2: 95% (2020 19:54) (95% - 95%)    TESTS & MEASUREMENTS:                        9.3    8.28  )-----------( 176      ( 2020 04:56 )             28.8     06-19    134<L>  |  94<L>  |  18  ----------------------------<  221<H>  4.4   |  28  |  0.9    Ca    8.7      2020 04:56  Mg     1.8     06-18    TPro  5.4<L>  /  Alb  3.4<L>  /  TBili  <0.2  /  DBili  x   /  AST  10  /  ALT  16  /  AlkPhos  72  06-19    LIVER FUNCTIONS - ( 2020 04:56 )  Alb: 3.4 g/dL / Pro: 5.4 g/dL / ALK PHOS: 72 U/L / ALT: 16 U/L / AST: 10 U/L / GGT: x             Culture - Blood (collected 20 @ 05:34)  Source: .Blood None  Preliminary Report (20 @ 13:01):    No growth to date.      Urinalysis Basic - ( 2020 21:10 )    Color: Yellow / Appearance: Clear / S.028 / pH: x  Gluc: x / Ketone: Negative  / Bili: Negative / Urobili: <2 mg/dL   Blood: x / Protein: Trace / Nitrite: Negative   Leuk Esterase: Negative / RBC: x / WBC x   Sq Epi: x / Non Sq Epi: x / Bacteria: x          RADIOLOGY & ADDITIONAL TESTS:  Personal review of radiological diagnostics performed  Echo and EKG results noted when applicable.     MEDICATIONS:  acetaminophen   Tablet .. 650 milliGRAM(s) Oral every 6 hours PRN  apixaban 5 milliGRAM(s) Oral every 12 hours  aspirin  chewable 81 milliGRAM(s) Oral daily  atorvastatin 80 milliGRAM(s) Oral at bedtime  chlorhexidine 4% Liquid 1 Application(s) Topical <User Schedule>  dextrose 40% Gel 15 Gram(s) Oral once PRN  dextrose 5%. 1000 milliLiter(s) IV Continuous <Continuous>  dextrose 50% Injectable 12.5 Gram(s) IV Push once  dextrose 50% Injectable 25 Gram(s) IV Push once  dextrose 50% Injectable 25 Gram(s) IV Push once  diltiazem    milliGRAM(s) Oral daily  DULoxetine 60 milliGRAM(s) Oral daily  ferrous    sulfate 325 milliGRAM(s) Oral two times a day  glucagon  Injectable 1 milliGRAM(s) IntraMuscular once PRN  insulin glargine Injectable (LANTUS) 40 Unit(s) SubCutaneous two times a day  insulin lispro (HumaLOG) corrective regimen sliding scale   SubCutaneous three times a day before meals  insulin lispro Injectable (HumaLOG) 20 Unit(s) SubCutaneous three times a day before meals  melatonin 5 milliGRAM(s) Oral at bedtime  metoprolol tartrate 50 milliGRAM(s) Oral two times a day  mupirocin 2% Ointment 1 Application(s) Topical every 12 hours  oxyCODONE    IR 10 milliGRAM(s) Oral every 4 hours PRN  pantoprazole    Tablet 40 milliGRAM(s) Oral before breakfast  pregabalin 100 milliGRAM(s) Oral three times a day  senna 2 Tablet(s) Oral at bedtime  silver sulfADIAZINE 1% Cream 1 Application(s) Topical two times a day  spironolactone 25 milliGRAM(s) Oral <User Schedule>  torsemide 10 milliGRAM(s) Oral daily  vancomycin  IVPB 1000 milliGRAM(s) IV Intermittent every 12 hours      ANTIBIOTICS:  vancomycin  IVPB 1000 milliGRAM(s) IV Intermittent every 12 hours

## 2020-06-20 LAB
ALBUMIN SERPL ELPH-MCNC: 3.5 G/DL — SIGNIFICANT CHANGE UP (ref 3.5–5.2)
ALP SERPL-CCNC: 73 U/L — SIGNIFICANT CHANGE UP (ref 30–115)
ALT FLD-CCNC: 14 U/L — SIGNIFICANT CHANGE UP (ref 0–41)
ANION GAP SERPL CALC-SCNC: 12 MMOL/L — SIGNIFICANT CHANGE UP (ref 7–14)
AST SERPL-CCNC: 9 U/L — SIGNIFICANT CHANGE UP (ref 0–41)
BILIRUB SERPL-MCNC: <0.2 MG/DL — SIGNIFICANT CHANGE UP (ref 0.2–1.2)
BUN SERPL-MCNC: 20 MG/DL — SIGNIFICANT CHANGE UP (ref 10–20)
CALCIUM SERPL-MCNC: 8.7 MG/DL — SIGNIFICANT CHANGE UP (ref 8.5–10.1)
CHLORIDE SERPL-SCNC: 94 MMOL/L — LOW (ref 98–110)
CO2 SERPL-SCNC: 29 MMOL/L — SIGNIFICANT CHANGE UP (ref 17–32)
CREAT SERPL-MCNC: 0.9 MG/DL — SIGNIFICANT CHANGE UP (ref 0.7–1.5)
GLUCOSE BLDC GLUCOMTR-MCNC: 274 MG/DL — HIGH (ref 70–99)
GLUCOSE SERPL-MCNC: 270 MG/DL — HIGH (ref 70–99)
HCT VFR BLD CALC: 28.7 % — LOW (ref 37–47)
HGB BLD-MCNC: 9.4 G/DL — LOW (ref 12–16)
MCHC RBC-ENTMCNC: 30.4 PG — SIGNIFICANT CHANGE UP (ref 27–31)
MCHC RBC-ENTMCNC: 32.8 G/DL — SIGNIFICANT CHANGE UP (ref 32–37)
MCV RBC AUTO: 92.9 FL — SIGNIFICANT CHANGE UP (ref 81–99)
NRBC # BLD: 0 /100 WBCS — SIGNIFICANT CHANGE UP (ref 0–0)
PLATELET # BLD AUTO: 190 K/UL — SIGNIFICANT CHANGE UP (ref 130–400)
POTASSIUM SERPL-MCNC: 4.4 MMOL/L — SIGNIFICANT CHANGE UP (ref 3.5–5)
POTASSIUM SERPL-SCNC: 4.4 MMOL/L — SIGNIFICANT CHANGE UP (ref 3.5–5)
PROT SERPL-MCNC: 5.5 G/DL — LOW (ref 6–8)
RBC # BLD: 3.09 M/UL — LOW (ref 4.2–5.4)
RBC # FLD: 16 % — HIGH (ref 11.5–14.5)
SARS-COV-2 RNA SPEC QL NAA+PROBE: SIGNIFICANT CHANGE UP
SODIUM SERPL-SCNC: 135 MMOL/L — SIGNIFICANT CHANGE UP (ref 135–146)
WBC # BLD: 8.09 K/UL — SIGNIFICANT CHANGE UP (ref 4.8–10.8)
WBC # FLD AUTO: 8.09 K/UL — SIGNIFICANT CHANGE UP (ref 4.8–10.8)

## 2020-06-20 PROCEDURE — 99232 SBSQ HOSP IP/OBS MODERATE 35: CPT

## 2020-06-20 RX ORDER — POLYETHYLENE GLYCOL 3350 17 G/17G
17 POWDER, FOR SOLUTION ORAL
Refills: 0 | Status: DISCONTINUED | OUTPATIENT
Start: 2020-06-20 | End: 2020-06-21

## 2020-06-20 RX ORDER — INSULIN GLARGINE 100 [IU]/ML
45 INJECTION, SOLUTION SUBCUTANEOUS
Refills: 0 | Status: DISCONTINUED | OUTPATIENT
Start: 2020-06-20 | End: 2020-06-21

## 2020-06-20 RX ORDER — INSULIN LISPRO 100/ML
24 VIAL (ML) SUBCUTANEOUS
Refills: 0 | Status: DISCONTINUED | OUTPATIENT
Start: 2020-06-20 | End: 2020-06-21

## 2020-06-20 RX ADMIN — Medication 81 MILLIGRAM(S): at 11:04

## 2020-06-20 RX ADMIN — Medication 325 MILLIGRAM(S): at 05:29

## 2020-06-20 RX ADMIN — Medication 360 MILLIGRAM(S): at 05:29

## 2020-06-20 RX ADMIN — Medication 3: at 08:21

## 2020-06-20 RX ADMIN — POLYETHYLENE GLYCOL 3350 17 GRAM(S): 17 POWDER, FOR SOLUTION ORAL at 17:35

## 2020-06-20 RX ADMIN — APIXABAN 5 MILLIGRAM(S): 2.5 TABLET, FILM COATED ORAL at 05:28

## 2020-06-20 RX ADMIN — Medication 100 MILLIGRAM(S): at 21:31

## 2020-06-20 RX ADMIN — Medication 100 MILLIGRAM(S): at 05:31

## 2020-06-20 RX ADMIN — Medication 5 MILLIGRAM(S): at 21:31

## 2020-06-20 RX ADMIN — Medication 3: at 17:32

## 2020-06-20 RX ADMIN — Medication 50 MILLIGRAM(S): at 17:34

## 2020-06-20 RX ADMIN — CHLORHEXIDINE GLUCONATE 1 APPLICATION(S): 213 SOLUTION TOPICAL at 05:36

## 2020-06-20 RX ADMIN — APIXABAN 5 MILLIGRAM(S): 2.5 TABLET, FILM COATED ORAL at 17:34

## 2020-06-20 RX ADMIN — Medication 20 UNIT(S): at 08:21

## 2020-06-20 RX ADMIN — Medication 100 MILLIGRAM(S): at 14:11

## 2020-06-20 RX ADMIN — Medication 50 MILLIGRAM(S): at 05:29

## 2020-06-20 RX ADMIN — INSULIN GLARGINE 45 UNIT(S): 100 INJECTION, SOLUTION SUBCUTANEOUS at 21:31

## 2020-06-20 RX ADMIN — Medication 2: at 12:01

## 2020-06-20 RX ADMIN — Medication 650 MILLIGRAM(S): at 01:34

## 2020-06-20 RX ADMIN — OXYCODONE HYDROCHLORIDE 10 MILLIGRAM(S): 5 TABLET ORAL at 05:28

## 2020-06-20 RX ADMIN — OXYCODONE HYDROCHLORIDE 10 MILLIGRAM(S): 5 TABLET ORAL at 22:00

## 2020-06-20 RX ADMIN — SENNA PLUS 2 TABLET(S): 8.6 TABLET ORAL at 21:31

## 2020-06-20 RX ADMIN — DULOXETINE HYDROCHLORIDE 60 MILLIGRAM(S): 30 CAPSULE, DELAYED RELEASE ORAL at 11:04

## 2020-06-20 RX ADMIN — Medication 1 APPLICATION(S): at 05:36

## 2020-06-20 RX ADMIN — Medication 24 UNIT(S): at 12:00

## 2020-06-20 RX ADMIN — MUPIROCIN 1 APPLICATION(S): 20 OINTMENT TOPICAL at 05:36

## 2020-06-20 RX ADMIN — OXYCODONE HYDROCHLORIDE 10 MILLIGRAM(S): 5 TABLET ORAL at 10:27

## 2020-06-20 RX ADMIN — ATORVASTATIN CALCIUM 80 MILLIGRAM(S): 80 TABLET, FILM COATED ORAL at 21:31

## 2020-06-20 RX ADMIN — MUPIROCIN 1 APPLICATION(S): 20 OINTMENT TOPICAL at 17:34

## 2020-06-20 RX ADMIN — Medication 100 MILLIGRAM(S): at 17:34

## 2020-06-20 RX ADMIN — Medication 10 MILLIGRAM(S): at 05:36

## 2020-06-20 RX ADMIN — Medication 325 MILLIGRAM(S): at 17:34

## 2020-06-20 RX ADMIN — Medication 100 MILLIGRAM(S): at 05:28

## 2020-06-20 RX ADMIN — Medication 1 APPLICATION(S): at 17:35

## 2020-06-20 RX ADMIN — PANTOPRAZOLE SODIUM 40 MILLIGRAM(S): 20 TABLET, DELAYED RELEASE ORAL at 05:29

## 2020-06-20 RX ADMIN — Medication 24 UNIT(S): at 17:32

## 2020-06-20 RX ADMIN — INSULIN GLARGINE 40 UNIT(S): 100 INJECTION, SOLUTION SUBCUTANEOUS at 08:21

## 2020-06-20 NOTE — PROGRESS NOTE ADULT - ATTENDING COMMENTS
Patient seen and examined independently. Agree with resident note/ history / physical exam and plan of care with following exceptions/additions/updates. Case discussed with patient/pt decision maker, house-staff and nursing.     pt has cpap , which is not set up due to PUI status, when covid status is cleared, resume the cpap.    full code. pls see notes from 6/16 re [facility]
Patient seen and examined independently. I agree with the resident's note, physical exam, and plan except as below.  Vital Signs Last 24 Hrs  T(C): 35.8 (17 Jun 2020 20:42), Max: 36.7 (17 Jun 2020 17:43)  T(F): 96.4 (17 Jun 2020 20:42), Max: 98 (17 Jun 2020 17:43)  HR: 73 (17 Jun 2020 20:42) (68 - 73)  BP: 148/64 (17 Jun 2020 20:42) (142/63 - 148/64)  BP(mean): 92 (17 Jun 2020 20:42) (92 - 92)  RR: 22 (17 Jun 2020 20:42) (20 - 22)  SpO2: 97% (18 Jun 2020 08:39) (97% - 97%)  PE  nad  aaox3  morbidly obese  [id number]  +PPM  ctabl  softntnd+bs  edema , LLE erythema half way to knee, wrapped    c/o chonic back pain and worsening LLE pain, no fever, chills    #LLE cellulitis - nonpurulent , no sepsis , SSD local wound care  on IV vanco per ID - will switch to po on 6/19  negative vascular workup  +MRSA nares - mupirocin decolonization     #DMII -uncontrolled -  insulin coverage inpt - increase scale  CAPILLARY BLOOD GLUCOSE  406 (18 Jun 2020 11:16)  357 (18 Jun 2020 08:04)  272 (17 Jun 2020 17:44)    upon dc may return to use inuslin pump    #afib chronic on eliquis ,rate controlled  #chronic diastolic CHF - no acute decompensation - cont diuresis   #hx of TAVR - avoid fluid overload  #morbidly obese - pt counselled  #acute on chronic back and leg pain - change to oxy 10mg q4prn  #debility - pt.rehab    plan is home with home care with DME  discussed with team
Patient is seen and examined independently at bedside. I agree with the resident's note, history and plan as above. Corrections made where appropriate    All labs, radiologic studies, vitals, orders and medications list reviewed.     #Progress Note Handoff  Pending (specify):   --  Family discussion: Plan of care discussed with patient, aware and agreeable   Disposition:  discharge to [facility] tomorrow, COVID neg 6/19    Chaparrita Rajput MD  s. [phone]
Patient is seen and examined independently at bedside. I agree with the resident's note, history and plan as above. Corrections made where appropriate    All labs, radiologic studies, vitals, orders and medications list reviewed.     LLE cellulitis - clinically improving, ID to switch abx to PO today  Morbid obesity/very limited mobility - patient reports having fallen at home 5xthis month, and is 2 person max assist with PT.  It is not safe for her to be discharged home as she has a  and an aid few hours day. Patient is considering SNF, wants to discuss it further with her     #Progress Note Handoff  Pending (specify):   ID f/u for PO abx choice, patient to discuss with  SNF options  Family discussion: Plan of care discussed with patient, aware and agreeable   Disposition:  unclear at this time    Chaparrita Rajput MD  s. [phone]

## 2020-06-20 NOTE — PROGRESS NOTE ADULT - SUBJECTIVE AND OBJECTIVE BOX
SUBJECTIVE:    Patient is a 75y old Female who presents with a chief complaint of Cellulitis (2020 13:33)    Currently admitted to medicine with the primary diagnosis of Cellulitis of lower extremity     Today is hospital day 4d. This morning she is resting comfortably in bed when seen and examined. Complaining of constipation for 5 days but otherwise no acute overnight events. Planning for d/c likely tomorrow to [facility].     PAST MEDICAL & SURGICAL HISTORY  Pacemaker  Asthma  HTN (hypertension)  DM (diabetes mellitus)    ALLERGIES:  No Known Allergies    MEDICATIONS:  STANDING MEDICATIONS  apixaban 5 milliGRAM(s) Oral every 12 hours  aspirin  chewable 81 milliGRAM(s) Oral daily  atorvastatin 80 milliGRAM(s) Oral at bedtime  bisacodyl 5 milliGRAM(s) Oral at bedtime  chlorhexidine 4% Liquid 1 Application(s) Topical <User Schedule>  dextrose 5%. 1000 milliLiter(s) IV Continuous <Continuous>  dextrose 50% Injectable 12.5 Gram(s) IV Push once  dextrose 50% Injectable 25 Gram(s) IV Push once  dextrose 50% Injectable 25 Gram(s) IV Push once  diltiazem    milliGRAM(s) Oral daily  doxycycline hyclate Capsule 100 milliGRAM(s) Oral every 12 hours  DULoxetine 60 milliGRAM(s) Oral daily  ferrous    sulfate 325 milliGRAM(s) Oral two times a day  insulin glargine Injectable (LANTUS) 45 Unit(s) SubCutaneous two times a day  insulin lispro (HumaLOG) corrective regimen sliding scale   SubCutaneous three times a day before meals  insulin lispro Injectable (HumaLOG) 24 Unit(s) SubCutaneous three times a day before meals  melatonin 5 milliGRAM(s) Oral at bedtime  metoprolol tartrate 50 milliGRAM(s) Oral two times a day  mupirocin 2% Ointment 1 Application(s) Topical every 12 hours  pantoprazole    Tablet 40 milliGRAM(s) Oral before breakfast  polyethylene glycol 3350 17 Gram(s) Oral two times a day  pregabalin 100 milliGRAM(s) Oral three times a day  senna 2 Tablet(s) Oral at bedtime  silver sulfADIAZINE 1% Cream 1 Application(s) Topical two times a day  spironolactone 25 milliGRAM(s) Oral <User Schedule>  torsemide 10 milliGRAM(s) Oral daily    PRN MEDICATIONS  acetaminophen   Tablet .. 650 milliGRAM(s) Oral every 6 hours PRN  dextrose 40% Gel 15 Gram(s) Oral once PRN  glucagon  Injectable 1 milliGRAM(s) IntraMuscular once PRN  oxyCODONE    IR 10 milliGRAM(s) Oral every 4 hours PRN    VITALS:   T(F): 98.1  HR: 76  BP: 135/60  RR: 19  SpO2: 96%    LABS:                        9.4    8.09  )-----------( 190      ( 2020 06:21 )             28.7     06-20    135  |  94<L>  |  20  ----------------------------<  270<H>  4.4   |  29  |  0.9    Ca    8.7      2020 06:21    TPro  5.5<L>  /  Alb  3.5  /  TBili  <0.2  /  DBili  x   /  AST  9   /  ALT  14  /  AlkPhos  73  06-20    Urinalysis Basic - ( 2020 21:10 )    Color: Yellow / Appearance: Clear / S.028 / pH: x  Gluc: x / Ketone: Negative  / Bili: Negative / Urobili: <2 mg/dL   Blood: x / Protein: Trace / Nitrite: Negative   Leuk Esterase: Negative / RBC: x / WBC x   Sq Epi: x / Non Sq Epi: x / Bacteria: x    RADIOLOGY:  No recent imaging    PHYSICAL EXAM:  GEN: No acute distress, breathing comfortably on room air, morbidly obese  PULM/CHEST: Clear to auscultation bilaterally, no rales, rhonchi or wheezes   CVS: Regular rate and rhythm, S1-S2, no murmurs  ABD: Soft, non-tender, non-distended, +BS  EXT: B/l lower extremities with chronic venous stasis and edema with ace wraps to b/l ankles/feet c/d/i  NEURO: AAOx3, no focal deficits SUBJECTIVE:    Patient is a 75y old Female who presents with a chief complaint of Cellulitis (2020 13:33)    Currently admitted to medicine with the primary diagnosis of Cellulitis of lower extremity     Today is hospital day 4d. This morning she is resting comfortably in bed when seen and examined. Complaining of constipation for 5 days but otherwise no acute overnight events. Insulin adjusted. Planning for d/c likely tomorrow to [facility].     PAST MEDICAL & SURGICAL HISTORY  Pacemaker  Asthma  HTN (hypertension)  DM (diabetes mellitus)    ALLERGIES:  No Known Allergies    MEDICATIONS:  STANDING MEDICATIONS  apixaban 5 milliGRAM(s) Oral every 12 hours  aspirin  chewable 81 milliGRAM(s) Oral daily  atorvastatin 80 milliGRAM(s) Oral at bedtime  bisacodyl 5 milliGRAM(s) Oral at bedtime  chlorhexidine 4% Liquid 1 Application(s) Topical <User Schedule>  dextrose 5%. 1000 milliLiter(s) IV Continuous <Continuous>  dextrose 50% Injectable 12.5 Gram(s) IV Push once  dextrose 50% Injectable 25 Gram(s) IV Push once  dextrose 50% Injectable 25 Gram(s) IV Push once  diltiazem    milliGRAM(s) Oral daily  doxycycline hyclate Capsule 100 milliGRAM(s) Oral every 12 hours  DULoxetine 60 milliGRAM(s) Oral daily  ferrous    sulfate 325 milliGRAM(s) Oral two times a day  insulin glargine Injectable (LANTUS) 45 Unit(s) SubCutaneous two times a day  insulin lispro (HumaLOG) corrective regimen sliding scale   SubCutaneous three times a day before meals  insulin lispro Injectable (HumaLOG) 24 Unit(s) SubCutaneous three times a day before meals  melatonin 5 milliGRAM(s) Oral at bedtime  metoprolol tartrate 50 milliGRAM(s) Oral two times a day  mupirocin 2% Ointment 1 Application(s) Topical every 12 hours  pantoprazole    Tablet 40 milliGRAM(s) Oral before breakfast  polyethylene glycol 3350 17 Gram(s) Oral two times a day  pregabalin 100 milliGRAM(s) Oral three times a day  senna 2 Tablet(s) Oral at bedtime  silver sulfADIAZINE 1% Cream 1 Application(s) Topical two times a day  spironolactone 25 milliGRAM(s) Oral <User Schedule>  torsemide 10 milliGRAM(s) Oral daily    PRN MEDICATIONS  acetaminophen   Tablet .. 650 milliGRAM(s) Oral every 6 hours PRN  dextrose 40% Gel 15 Gram(s) Oral once PRN  glucagon  Injectable 1 milliGRAM(s) IntraMuscular once PRN  oxyCODONE    IR 10 milliGRAM(s) Oral every 4 hours PRN    VITALS:   T(F): 98.1  HR: 76  BP: 135/60  RR: 19  SpO2: 96%    LABS:                        9.4    8.09  )-----------( 190      ( 2020 06:21 )             28.7     06-20    135  |  94<L>  |  20  ----------------------------<  270<H>  4.4   |  29  |  0.9    Ca    8.7      2020 06:21    TPro  5.5<L>  /  Alb  3.5  /  TBili  <0.2  /  DBili  x   /  AST  9   /  ALT  14  /  AlkPhos  73  06-20    Urinalysis Basic - ( 2020 21:10 )    Color: Yellow / Appearance: Clear / S.028 / pH: x  Gluc: x / Ketone: Negative  / Bili: Negative / Urobili: <2 mg/dL   Blood: x / Protein: Trace / Nitrite: Negative   Leuk Esterase: Negative / RBC: x / WBC x   Sq Epi: x / Non Sq Epi: x / Bacteria: x    RADIOLOGY:  No recent imaging    PHYSICAL EXAM:  GEN: No acute distress, breathing comfortably on room air, morbidly obese  PULM/CHEST: Clear to auscultation bilaterally, no rales, rhonchi or wheezes   CVS: Regular rate and rhythm, S1-S2, no murmurs  ABD: Soft, non-tender, non-distended, +BS  EXT: B/l lower extremities with chronic venous stasis and edema with ace wraps to b/l ankles/feet c/d/i  NEURO: AAOx3, no focal deficits

## 2020-06-20 NOTE — PROGRESS NOTE ADULT - ASSESSMENT
75 year-old female with PMH of morbid obesity, T2DM, HTN, Valve replacement, Pacemaker, Asthma, b/l knee replacement, and spinal fusion presented to the ED with left lower leg pain and swelling.     # Left Lower Extremity Cellulitis, resolving  - HD stable, Sepsis ruled out on admission. Leukocytosis has resolved, now afebrile  - Burn following- wound care as per burn, no intervention planned.   - ID agree with the diagnosis of LLE cellulitis and noninfected superficial wounds, Continue with Vancomycin 1g [id number] and local Silvadene cream  - Vascular was consulted, they recommended bilateral duplex arterial and venous studies; both of which were normal   - f/u MRSA, blood cx    #MRSA nares  -Mupirocin local   -No need for isolation for that     # H/o TAVR  # H/o A fib?/ CHF- unknown type  - c/w eliquis, Cardizem, metoprolol, ASA  - c/w spironolactone, torsemide    # DM  - FS qc hs  - start on lantus/lispro and SSI  - Hold home med OHA  - pt has new insulin pump from 2 weeks ago- doesnt know how to use it, will use subq insulin here based on home dose.   - c/w pregabalin    # H/o HTN  - c/w home meds    # h/o Asthma - stable  - Duoneb PRN    # Morbid Obesity,   - patient counselled on importance of healthy diet and exercise, has poor exercise tolerance.   - needs outpatient follow up    The patient has asthma and bilateral lower extremities cellulitis which caused mobility limitation that significantly impairs the patients ability to participate in one or more MRADLs such as toileting, eating, dressing, and bathing in customary locations in home. the pts home provides adequate access between the rooms for the use of bariatric manual wheelchair. the wheelchair will significantly improve the pt ability to participate in MDARLs and will be used on a regular basis in the home. the pts mobility limitations cannot be resolved with the use of cane or walker. the pts has sufficient upper extremities function to safely propel the bariatric manual wheelchair in the home during typical day. The pt has agreed to use the bariatric manual wheelchair that is provided in the home. this patient also requires a bariatric commode to use in the home due to decreased endurance secondary to bilateral lower extremities cellulitis and asthma    DVT ppx- eliquis  GI ppx- on omeprazole at home  Ambulate as tolerated  diet- dash / carb consistent  dispo- acute, from home. lives with family. will need pt eval prior to dc 75 year-old female with PMH of morbid obesity, T2DM, HTN, TAVR, Pacemaker, Asthma, b/l knee replacement, and spinal fusion presented to the ED with left lower leg pain and swelling.     # Left Lower Extremity Cellulitis, resolving  - HD stable, Sepsis ruled out on admission. Leukocytosis has resolved, now afebrile  - Arterial and venous duplex b/l LE without abnormalities  - Burn following- wound care as per burn, no intervention planned.   - BCx 6/17 NGTD. ID recs: Vancomycin discontinued. C/w PO Doxycycline 100 mg BID with end date 6/26/20    # MRSA nares  - C/w Bactroban BID to nares for 5 days (last day 6/21)    # Paroxysmal Atrial fibrillation, rate-controlled  - Rate control: PO Cardizem  mg qD, PO Lopressor 50 mg BID  - AC: Eliquis 5 mg BID    # CHF likely severely reduced EF, no evidence of acute exacerbation  - No TTE in system. C/w BB. Unclear why patient not on ACE-I. Will discuss with primary team  - C/w PO Torsemide 10 mg qD  - C/w Spironolactone 25 mg every 2 days    # T2DM, A1c 8.3% with neuroapthy  - Has insulin pump from 2 weeks ago but does not know how to use as  takes care of this  - Increased insulin regimen this AM: Lantus 45U BID, Lispro 24U qAC, sliding scale  - C/w Lyrica, Cymbalta    # HTN, well-controlled  - C/w Lopressor as above  - Monitor BP. Adjust regimen as needed. Will discuss adding ACE-I    # Asthma, no evidence of acute exacerbation  - Duoneb PRN    # CAD vs. Hyperlipidemia?, stable  - C/w high-intensity statin, BB, ASA  - Outpatient f/u    # Morbid Obesity  - Patient counselled on importance of healthy diet and exercise, has poor exercise tolerance   - Needs outpatient follow up with PCP and nutrition  - The patient has asthma and bilateral lower extremities cellulitis which caused mobility limitation that significantly impairs the patients ability to participate in one or more MRADLs such as toileting, eating, dressing, and bathing in customary locations in home. the pts home provides adequate access between the rooms for the use of bariatric manual wheelchair. the wheelchair will significantly improve the pt ability to participate in MDARLs and will be used on a regular basis in the home. the pts mobility limitations cannot be resolved with the use of cane or walker. the pts has sufficient upper extremities function to safely propel the bariatric manual wheelchair in the home during typical day. The pt has agreed to use the bariatric manual wheelchair that is provided in the home. this patient also requires a bariatric commode to use in the home due to decreased endurance secondary to bilateral lower extremities cellulitis and asthma    # Chronic pain 2/2 spinal fusion   - C/w Tylenol 650 mg q6h PRN mild pain, Oxycodone 10 mg IR q4h PRN moderate pain  - Pain well-controlled at this time. Bowel regimen ordered. Will f/u daily BMs    # GERD  - C/w Protonix (on home Omeprazole)    #DVT ppx: Eliquis  #GI ppx: Pantoprazole 40 mg qD  #Diet: DASH/TLC consistent carbs  #Activity: ambulate as tolerated, although has poor functional status  #Dispo: likely d/c tomorrow, coming from home but likely for SNF  #Code status: FULL CODE  #Follow-up: ____ACE-I?,_d/c planning__________________

## 2020-06-21 ENCOUNTER — TRANSCRIPTION ENCOUNTER (OUTPATIENT)
Age: 75
End: 2020-06-21

## 2020-06-21 VITALS
RESPIRATION RATE: 18 BRPM | SYSTOLIC BLOOD PRESSURE: 148 MMHG | HEART RATE: 63 BPM | TEMPERATURE: 98 F | DIASTOLIC BLOOD PRESSURE: 68 MMHG

## 2020-06-21 LAB
GLUCOSE BLDC GLUCOMTR-MCNC: 280 MG/DL — HIGH (ref 70–99)
GLUCOSE BLDC GLUCOMTR-MCNC: 306 MG/DL — HIGH (ref 70–99)

## 2020-06-21 PROCEDURE — 99239 HOSP IP/OBS DSCHRG MGMT >30: CPT

## 2020-06-21 RX ORDER — POLYETHYLENE GLYCOL 3350 17 G/17G
17 POWDER, FOR SOLUTION ORAL
Qty: 0 | Refills: 0 | DISCHARGE
Start: 2020-06-21

## 2020-06-21 RX ORDER — ACETAMINOPHEN 500 MG
2 TABLET ORAL
Qty: 0 | Refills: 0 | DISCHARGE
Start: 2020-06-21

## 2020-06-21 RX ORDER — INSULIN GLARGINE 100 [IU]/ML
10 INJECTION, SOLUTION SUBCUTANEOUS ONCE
Refills: 0 | Status: COMPLETED | OUTPATIENT
Start: 2020-06-21 | End: 2020-06-21

## 2020-06-21 RX ORDER — INSULIN DEGLUDEC 100 U/ML
0 INJECTION, SOLUTION SUBCUTANEOUS
Qty: 0 | Refills: 0 | DISCHARGE

## 2020-06-21 RX ORDER — INSULIN GLARGINE 100 [IU]/ML
55 INJECTION, SOLUTION SUBCUTANEOUS
Qty: 0 | Refills: 0 | DISCHARGE
Start: 2020-06-21

## 2020-06-21 RX ORDER — INSULIN LISPRO 100/ML
30 VIAL (ML) SUBCUTANEOUS
Refills: 0 | Status: DISCONTINUED | OUTPATIENT
Start: 2020-06-21 | End: 2020-06-21

## 2020-06-21 RX ORDER — INSULIN LISPRO 100/ML
27 VIAL (ML) SUBCUTANEOUS
Refills: 0 | Status: DISCONTINUED | OUTPATIENT
Start: 2020-06-21 | End: 2020-06-21

## 2020-06-21 RX ORDER — METOPROLOL TARTRATE 50 MG
1 TABLET ORAL
Qty: 0 | Refills: 0 | DISCHARGE

## 2020-06-21 RX ORDER — INSULIN GLARGINE 100 [IU]/ML
55 INJECTION, SOLUTION SUBCUTANEOUS
Refills: 0 | Status: DISCONTINUED | OUTPATIENT
Start: 2020-06-21 | End: 2020-06-21

## 2020-06-21 RX ORDER — DILTIAZEM HCL 120 MG
1 CAPSULE, EXT RELEASE 24 HR ORAL
Qty: 0 | Refills: 0 | DISCHARGE
Start: 2020-06-21

## 2020-06-21 RX ORDER — OXYCODONE HYDROCHLORIDE 5 MG/1
1 TABLET ORAL
Qty: 0 | Refills: 0 | DISCHARGE
Start: 2020-06-21

## 2020-06-21 RX ORDER — DILTIAZEM HCL 120 MG
1 CAPSULE, EXT RELEASE 24 HR ORAL
Qty: 0 | Refills: 0 | DISCHARGE

## 2020-06-21 RX ORDER — INSULIN LISPRO 100/ML
0 VIAL (ML) SUBCUTANEOUS
Qty: 0 | Refills: 0 | DISCHARGE

## 2020-06-21 RX ORDER — METFORMIN HYDROCHLORIDE 850 MG/1
1 TABLET ORAL
Qty: 0 | Refills: 0 | DISCHARGE

## 2020-06-21 RX ORDER — INSULIN LISPRO 100/ML
30 VIAL (ML) SUBCUTANEOUS
Qty: 1 | Refills: 0
Start: 2020-06-21 | End: 2020-07-20

## 2020-06-21 RX ORDER — METOPROLOL TARTRATE 50 MG
1 TABLET ORAL
Qty: 0 | Refills: 0 | DISCHARGE
Start: 2020-06-21

## 2020-06-21 RX ORDER — SENNA PLUS 8.6 MG/1
2 TABLET ORAL
Qty: 0 | Refills: 0 | DISCHARGE
Start: 2020-06-21

## 2020-06-21 RX ADMIN — Medication 1 APPLICATION(S): at 05:28

## 2020-06-21 RX ADMIN — Medication 100 MILLIGRAM(S): at 13:12

## 2020-06-21 RX ADMIN — SPIRONOLACTONE 25 MILLIGRAM(S): 25 TABLET, FILM COATED ORAL at 08:46

## 2020-06-21 RX ADMIN — Medication 650 MILLIGRAM(S): at 07:16

## 2020-06-21 RX ADMIN — Medication 3: at 12:03

## 2020-06-21 RX ADMIN — MUPIROCIN 1 APPLICATION(S): 20 OINTMENT TOPICAL at 05:28

## 2020-06-21 RX ADMIN — Medication 10 MILLIGRAM(S): at 05:29

## 2020-06-21 RX ADMIN — OXYCODONE HYDROCHLORIDE 10 MILLIGRAM(S): 5 TABLET ORAL at 13:12

## 2020-06-21 RX ADMIN — Medication 4: at 08:45

## 2020-06-21 RX ADMIN — CHLORHEXIDINE GLUCONATE 1 APPLICATION(S): 213 SOLUTION TOPICAL at 05:33

## 2020-06-21 RX ADMIN — Medication 81 MILLIGRAM(S): at 11:51

## 2020-06-21 RX ADMIN — APIXABAN 5 MILLIGRAM(S): 2.5 TABLET, FILM COATED ORAL at 05:26

## 2020-06-21 RX ADMIN — Medication 50 MILLIGRAM(S): at 05:27

## 2020-06-21 RX ADMIN — DULOXETINE HYDROCHLORIDE 60 MILLIGRAM(S): 30 CAPSULE, DELAYED RELEASE ORAL at 11:51

## 2020-06-21 RX ADMIN — Medication 100 MILLIGRAM(S): at 05:27

## 2020-06-21 RX ADMIN — Medication 360 MILLIGRAM(S): at 05:27

## 2020-06-21 RX ADMIN — Medication 30 UNIT(S): at 12:03

## 2020-06-21 RX ADMIN — Medication 24 UNIT(S): at 08:20

## 2020-06-21 RX ADMIN — Medication 325 MILLIGRAM(S): at 05:27

## 2020-06-21 RX ADMIN — PANTOPRAZOLE SODIUM 40 MILLIGRAM(S): 20 TABLET, DELAYED RELEASE ORAL at 07:09

## 2020-06-21 RX ADMIN — OXYCODONE HYDROCHLORIDE 10 MILLIGRAM(S): 5 TABLET ORAL at 07:13

## 2020-06-21 RX ADMIN — INSULIN GLARGINE 45 UNIT(S): 100 INJECTION, SOLUTION SUBCUTANEOUS at 08:46

## 2020-06-21 RX ADMIN — Medication 100 MILLIGRAM(S): at 05:36

## 2020-06-21 NOTE — CHART NOTE - NSCHARTNOTEFT_GEN_A_CORE
<<<RESIDENT DISCHARGE NOTE>>>     LAUREN RAGSDALE  MRN-[id number]    VITAL SIGNS:  T(F): 97.6 (06-21-20 @ 05:23), Max: 97.6 (06-21-20 @ 05:23)  HR: 63 (06-21-20 @ 05:23)  BP: 148/68 (06-21-20 @ 05:23)    PHYSICAL EXAMINATION:  GEN: No acute distress, breathing comfortably on room air, morbidly obese  PULM/CHEST: Clear to auscultation bilaterally, no rales, rhonchi or wheezes   CVS: Regular rate and rhythm, S1-S2, no murmurs  ABD: Soft, non-tender, non-distended, +BS  EXT: B/l lower extremities with chronic venous stasis and edema with ace wraps to b/l ankles/feet c/d/i  NEURO: AAOx3, no focal deficits    TEST RESULTS:                        9.4    8.09  )-----------( 190      ( 20 Jun 2020 06:21 )             28.7       06-20    135  |  94<L>  |  20  ----------------------------<  270<H>  4.4   |  29  |  0.9    Ca    8.7      20 Jun 2020 06:21    TPro  5.5<L>  /  Alb  3.5  /  TBili  <0.2  /  DBili  x   /  AST  9   /  ALT  14  /  AlkPhos  73  06-20      FINAL DISCHARGE INTERVIEW:  Resident(s) Present: (Name:___Miguel__________)    DISCHARGE MEDICATION RECONCILIATION  reviewed with Attending (Name:____Atiya_______)    D/c to SNF ([facility]) <<<RESIDENT DISCHARGE NOTE>>>     LAUREN RAGSDALE  MRN-[id number]    VITAL SIGNS:  T(F): 97.6 (06-21-20 @ 05:23), Max: 97.6 (06-21-20 @ 05:23)  HR: 63 (06-21-20 @ 05:23)  BP: 148/68 (06-21-20 @ 05:23)    PHYSICAL EXAMINATION:  GEN: No acute distress, breathing comfortably on room air, morbidly obese  PULM/CHEST: Clear to auscultation bilaterally, no rales, rhonchi or wheezes   CVS: Regular rate and rhythm, S1-S2, no murmurs  ABD: Soft, non-tender, non-distended, +BS  EXT: B/l lower extremities with chronic venous stasis and edema with ace wraps to b/l ankles/feet c/d/i  NEURO: AAOx3, no focal deficits    TEST RESULTS:                        9.4    8.09  )-----------( 190      ( 20 Jun 2020 06:21 )             28.7     135  |  94<L>  |  20  ----------------------------<  270<H>  4.4   |  29  |  0.9    Ca    8.7      20 Jun 2020 06:21    TPro  5.5<L>  /  Alb  3.5  /  TBili  <0.2  /  DBili  x   /  AST  9   /  ALT  14  /  AlkPhos  73  06-20    FINAL DISCHARGE INTERVIEW:  Resident(s) Present: (Name: __Miguel__)    DISCHARGE MEDICATION RECONCILIATION  reviewed with Attending (Name:__Atiya_)    D/c to SNF ([facility])  ATTENDING ATTESTATION: patient seen and examined. Insulin regimen adjusted. Patient is stable for discharge to SNF. She is agreement with the plans

## 2020-06-21 NOTE — DISCHARGE NOTE PROVIDER - NSDCCPCAREPLAN_GEN_ALL_CORE_FT
PRINCIPAL DISCHARGE DIAGNOSIS  Diagnosis: Cellulitis of lower extremity  Assessment and Plan of Treatment: You came to the hospital due to an infection of your lower extremity. You were given IV antibiotics which was transitioned to oral antibiotics with end date 6/26/20; please take as prescribed. Wound care orders have been written in this document. Please follow-up with your primary care provider in one week to review your hospitalization and for further care.      SECONDARY DISCHARGE DIAGNOSES  Diagnosis: Type 2 diabetes mellitus  Assessment and Plan of Treatment: You have a known history of diabetes with a recently implanted insulin pump, which was NOT being utilized while you were in the hospital. Your blood glucose readings were elevated for most of your hospitalization and your insulin requirements increased daily. Please take all medications as prescribed. Follow-up with your primary care provider in one week for further care. Monitor your glucose on finger sticks three times a day before meals and keep a log of your readings to bring to your primary care provider.    Diagnosis: MRSA infection  Assessment and Plan of Treatment: You were found to be colonized with MRSA at your nares (nose). You were given Bactroban cream for 5 days and completed treatment prior to discharge. PRINCIPAL DISCHARGE DIAGNOSIS  Diagnosis: Cellulitis of lower extremity  Assessment and Plan of Treatment: You came to the hospital due to an infection of your lower extremity. You were given IV antibiotics which was transitioned to oral antibiotics with end date 6/26/20; please take as prescribed. Wound care orders have been written in this document. Please follow-up with your primary care provider in one week to review your hospitalization and for further care.      SECONDARY DISCHARGE DIAGNOSES  Diagnosis: Type 2 diabetes mellitus  Assessment and Plan of Treatment: You have a known history of diabetes with a recently implanted insulin pump, which was NOT being utilized while you were in the hospital. Your blood glucose readings were elevated for most of your hospitalization and your insulin requirements increased daily. Please take all medications as prescribed. Follow-up with your primary care provider in one week for further care. Monitor your glucose on finger sticks three times a day before meals and keep a log of your readings to bring to your primary care provider. Follow-up with Dr. Victoria at your earliest convenience (information provided).    Diagnosis: MRSA infection  Assessment and Plan of Treatment: You were found to be colonized with MRSA at your nares (nose). You were given Bactroban cream for 5 days and completed treatment prior to discharge.

## 2020-06-21 NOTE — DISCHARGE NOTE PROVIDER - NSDCFUADDINST_GEN_ALL_CORE_FT
Wound care orders: Wash bilateral lower extremities with soap and water. Apply silvadene, Kerlix, and adaptic twice a day.

## 2020-06-21 NOTE — DISCHARGE NOTE PROVIDER - NSDCMRMEDTOKEN_GEN_ALL_CORE_FT
aspirin 81 mg oral tablet: 1 tab(s) orally once a day  dilTIAZem 360 mg/24 hours oral capsule, extended release: 1 cap(s) orally once a day  doxycycline monohydrate 100 mg oral capsule: 1 cap(s) orally every 12 hours LAST DOSE 6/26 PM  DULoxetine 60 mg oral delayed release capsule: 1 cap(s) orally once a day  Eliquis 5 mg oral tablet: 1 tab(s) orally 2 times a day  ferrous sulfate 325 mg (65 mg elemental iron) oral tablet: 1 tab(s) orally 3 times a day  HumaLOG:   metFORMIN 1000 mg oral tablet: 1 tab(s) orally 2 times a day  metoprolol tartrate 50 mg oral tablet: 1 tab(s) orally 2 times a day  omeprazole 40 mg oral delayed release capsule:   pregabalin 100 mg oral capsule: 1 tab(s) orally 3 times a day  rosuvastatin 20 mg oral tablet: 1 tab(s) orally once a day  silver sulfADIAZINE 1% topical cream: 1 application topically 2 times a day  spironolactone 25 mg oral tablet: 1 tab(s) orally every other day (at bedtime)  torsemide 10 mg oral tablet: 1 tab(s) orally once a day  Tresiba: acetaminophen 325 mg oral tablet: 2 tab(s) orally every 6 hours, As needed, Mild Pain (1 - 3)  aspirin 81 mg oral tablet: 1 tab(s) orally once a day  bisacodyl 5 mg oral delayed release tablet: 1 tab(s) orally once a day (at bedtime)  dilTIAZem 360 mg/24 hours oral capsule, extended release: 1 cap(s) orally once a day  doxycycline monohydrate 100 mg oral capsule: 1 cap(s) orally every 12 hours LAST DOSE 6/26 PM  DULoxetine 60 mg oral delayed release capsule: 1 cap(s) orally once a day  Eliquis 5 mg oral tablet: 1 tab(s) orally 2 times a day  ferrous sulfate 325 mg (65 mg elemental iron) oral tablet: 1 tab(s) orally 3 times a day  insulin glargine: 55 unit(s) subcutaneous 2 times a day once in AM and once at night  insulin lispro: 30 unit(s) subcutaneous 3 times a day (before meals)   metoprolol tartrate 50 mg oral tablet: 1 tab(s) orally 2 times a day  omeprazole 40 mg oral delayed release capsule:   oxyCODONE 10 mg oral tablet: 1 tab(s) orally every 4 hours, As needed, Moderate Pain (4 - 6)  polyethylene glycol 3350 oral powder for reconstitution: 17 gram(s) orally once a day  pregabalin 100 mg oral capsule: 1 tab(s) orally 3 times a day  rosuvastatin 20 mg oral tablet: 1 tab(s) orally once a day  senna oral tablet: 2 tab(s) orally once a day (at bedtime)  silver sulfADIAZINE 1% topical cream: 1 application topically 2 times a day  spironolactone 25 mg oral tablet: 1 tab(s) orally every other day (at bedtime)  torsemide 10 mg oral tablet: 1 tab(s) orally once a day

## 2020-06-21 NOTE — DISCHARGE NOTE PROVIDER - HOSPITAL COURSE
75 year-old female admitted for LLE cellulitis treatment. Required IV Vancomycin which was transitioned to PO Doxycycline with end date 6/26. Cellulitis was resolving prior to discharge. BCx were negative, no sepsis was present. Was found to be MRSA nares positive and completed a 5-day course of Bactroban to nares.         Hospitalization was complicated by hyperglycemia. Has insulin pump recently implanted but does not know how to use it herself, so she was given Lantus/Lispro regimen. Insulin requirements prior to discharge: Lantus 45U BID, Lispro 27U with meals, with sliding scale. 75 year-old female admitted for LLE cellulitis treatment. Required IV Vancomycin which was transitioned to PO Doxycycline with end date 6/26. Cellulitis was resolving prior to discharge. BCx were negative, no sepsis was present. Was found to be MRSA nares positive and completed a 5-day course of Bactroban to nares.         Hospitalization was complicated by hyperglycemia. Has insulin pump recently implanted but does not know how to use it herself, so she was given Lantus/Lispro regimen. Insulin requirements prior to discharge: Lantus 55U BID, Lispro 30U with meals, with sliding scale.

## 2020-06-21 NOTE — DISCHARGE NOTE PROVIDER - CARE PROVIDER_API CALL
Dr. Johnson,   Phone: (   )    -  Fax: (   )    -  Follow Up Time: 1 week    Craig Victoria  ENDOCRINOLOGY/METAB/DIABETES  [address] [place] [address]  Phone: [phone]  Fax: [phone]  Follow Up Time: 1 week

## 2020-06-21 NOTE — DISCHARGE NOTE PROVIDER - PROVIDER TOKENS
FREE:[LAST:[Dr. Johnson],PHONE:[(   )    -],FAX:[(   )    -],FOLLOWUP:[1 week]],PROVIDER:[TOKEN:[[id number]],FOLLOWUP:[1 week]]

## 2020-06-22 LAB
CULTURE RESULTS: SIGNIFICANT CHANGE UP
SPECIMEN SOURCE: SIGNIFICANT CHANGE UP

## 2020-06-24 DIAGNOSIS — E11.9 TYPE 2 DIABETES MELLITUS WITHOUT COMPLICATIONS: ICD-10-CM

## 2020-06-24 DIAGNOSIS — G89.29 OTHER CHRONIC PAIN: ICD-10-CM

## 2020-06-24 DIAGNOSIS — Z79.899 OTHER LONG TERM (CURRENT) DRUG THERAPY: ICD-10-CM

## 2020-06-24 DIAGNOSIS — Z79.01 LONG TERM (CURRENT) USE OF ANTICOAGULANTS: ICD-10-CM

## 2020-06-24 DIAGNOSIS — I50.32 CHRONIC DIASTOLIC (CONGESTIVE) HEART FAILURE: ICD-10-CM

## 2020-06-24 DIAGNOSIS — B95.62 METHICILLIN RESISTANT STAPHYLOCOCCUS AUREUS INFECTION AS THE CAUSE OF DISEASES CLASSIFIED ELSEWHERE: ICD-10-CM

## 2020-06-24 DIAGNOSIS — Z79.84 LONG TERM (CURRENT) USE OF ORAL HYPOGLYCEMIC DRUGS: ICD-10-CM

## 2020-06-24 DIAGNOSIS — L97.929 NON-PRESSURE CHRONIC ULCER OF UNSPECIFIED PART OF LEFT LOWER LEG WITH UNSPECIFIED SEVERITY: ICD-10-CM

## 2020-06-24 DIAGNOSIS — M54.9 DORSALGIA, UNSPECIFIED: ICD-10-CM

## 2020-06-24 DIAGNOSIS — E66.01 MORBID (SEVERE) OBESITY DUE TO EXCESS CALORIES: ICD-10-CM

## 2020-06-24 DIAGNOSIS — L03.116 CELLULITIS OF LEFT LOWER LIMB: ICD-10-CM

## 2020-06-24 DIAGNOSIS — I25.10 ATHEROSCLEROTIC HEART DISEASE OF NATIVE CORONARY ARTERY WITHOUT ANGINA PECTORIS: ICD-10-CM

## 2020-06-24 DIAGNOSIS — Z79.82 LONG TERM (CURRENT) USE OF ASPIRIN: ICD-10-CM

## 2020-06-24 DIAGNOSIS — I87.8 OTHER SPECIFIED DISORDERS OF VEINS: ICD-10-CM

## 2020-06-24 DIAGNOSIS — Z96.41 PRESENCE OF INSULIN PUMP (EXTERNAL) (INTERNAL): ICD-10-CM

## 2020-06-24 DIAGNOSIS — I11.0 HYPERTENSIVE HEART DISEASE WITH HEART FAILURE: ICD-10-CM

## 2020-06-24 DIAGNOSIS — Z95.3 PRESENCE OF XENOGENIC HEART VALVE: ICD-10-CM

## 2020-06-24 DIAGNOSIS — Z95.0 PRESENCE OF CARDIAC PACEMAKER: ICD-10-CM

## 2020-06-24 DIAGNOSIS — Z98.1 ARTHRODESIS STATUS: ICD-10-CM

## 2020-06-24 DIAGNOSIS — I48.0 PAROXYSMAL ATRIAL FIBRILLATION: ICD-10-CM

## 2020-06-24 DIAGNOSIS — I48.20 CHRONIC ATRIAL FIBRILLATION, UNSPECIFIED: ICD-10-CM

## 2020-06-24 DIAGNOSIS — Z79.4 LONG TERM (CURRENT) USE OF INSULIN: ICD-10-CM

## 2020-06-24 DIAGNOSIS — K21.9 GASTRO-ESOPHAGEAL REFLUX DISEASE WITHOUT ESOPHAGITIS: ICD-10-CM

## 2020-06-24 DIAGNOSIS — Z96.653 PRESENCE OF ARTIFICIAL KNEE JOINT, BILATERAL: ICD-10-CM

## 2020-06-24 DIAGNOSIS — J45.909 UNSPECIFIED ASTHMA, UNCOMPLICATED: ICD-10-CM

## 2020-07-20 PROBLEM — J45.909 UNSPECIFIED ASTHMA, UNCOMPLICATED: Chronic | Status: ACTIVE | Noted: 2020-06-16

## 2020-07-20 PROBLEM — Z95.0 PRESENCE OF CARDIAC PACEMAKER: Chronic | Status: ACTIVE | Noted: 2020-06-16

## 2020-07-20 PROBLEM — E11.9 TYPE 2 DIABETES MELLITUS WITHOUT COMPLICATIONS: Chronic | Status: ACTIVE | Noted: 2020-06-16

## 2020-07-20 PROBLEM — I10 ESSENTIAL (PRIMARY) HYPERTENSION: Chronic | Status: ACTIVE | Noted: 2020-06-16

## 2020-07-23 ENCOUNTER — OUTPATIENT (OUTPATIENT)
Dept: OUTPATIENT SERVICES | Facility: HOSPITAL | Age: 75
LOS: 1 days | Discharge: HOME | End: 2020-07-23

## 2020-07-23 ENCOUNTER — APPOINTMENT (OUTPATIENT)
Dept: BURN CARE | Facility: CLINIC | Age: 75
End: 2020-07-23
Payer: MEDICARE

## 2020-07-23 PROCEDURE — 99213 OFFICE O/P EST LOW 20 MIN: CPT | Mod: 25

## 2020-07-23 PROCEDURE — 16025 DRESS/DEBRID P-THICK BURN M: CPT

## 2020-08-20 ENCOUNTER — APPOINTMENT (OUTPATIENT)
Dept: BURN CARE | Facility: CLINIC | Age: 75
End: 2020-08-20
Payer: MEDICARE

## 2020-08-20 ENCOUNTER — OUTPATIENT (OUTPATIENT)
Dept: OUTPATIENT SERVICES | Facility: HOSPITAL | Age: 75
LOS: 1 days | Discharge: HOME | End: 2020-08-20

## 2020-08-20 DIAGNOSIS — S81.802A UNSPECIFIED OPEN WOUND, LEFT LOWER LEG, INITIAL ENCOUNTER: ICD-10-CM

## 2020-08-20 DIAGNOSIS — S81.801A UNSPECIFIED OPEN WOUND, RIGHT LOWER LEG, INITIAL ENCOUNTER: ICD-10-CM

## 2020-08-20 PROCEDURE — 16030 DRESS/DEBRID P-THICK BURN L: CPT

## 2020-08-20 PROCEDURE — 99213 OFFICE O/P EST LOW 20 MIN: CPT | Mod: 25

## 2020-08-25 PROBLEM — S81.801A WOUND OF RIGHT LOWER EXTREMITY: Status: ACTIVE | Noted: 2020-06-16

## 2020-08-25 PROBLEM — S81.802A WOUND OF LEFT LOWER EXTREMITY: Status: ACTIVE | Noted: 2020-06-16

## 2020-08-29 ENCOUNTER — INPATIENT (INPATIENT)
Facility: HOSPITAL | Age: 75
LOS: 2 days | Discharge: HOME HEALTH | End: 2020-09-01
Attending: INTERNAL MEDICINE | Admitting: INTERNAL MEDICINE
Payer: MEDICARE

## 2020-08-29 VITALS
HEIGHT: 63 IN | TEMPERATURE: 98 F | OXYGEN SATURATION: 96 % | RESPIRATION RATE: 20 BRPM | DIASTOLIC BLOOD PRESSURE: 78 MMHG | SYSTOLIC BLOOD PRESSURE: 173 MMHG | HEART RATE: 71 BPM | WEIGHT: 293 LBS

## 2020-08-29 DIAGNOSIS — Z95.0 PRESENCE OF CARDIAC PACEMAKER: Chronic | ICD-10-CM

## 2020-08-29 LAB
ALBUMIN SERPL ELPH-MCNC: 3.9 G/DL — SIGNIFICANT CHANGE UP (ref 3.5–5.2)
ALP SERPL-CCNC: 83 U/L — SIGNIFICANT CHANGE UP (ref 30–115)
ALT FLD-CCNC: 8 U/L — SIGNIFICANT CHANGE UP (ref 0–41)
ANION GAP SERPL CALC-SCNC: 14 MMOL/L — SIGNIFICANT CHANGE UP (ref 7–14)
APPEARANCE UR: CLEAR — SIGNIFICANT CHANGE UP
APTT BLD: 31.3 SEC — SIGNIFICANT CHANGE UP (ref 27–39.2)
AST SERPL-CCNC: 10 U/L — SIGNIFICANT CHANGE UP (ref 0–41)
BASOPHILS # BLD AUTO: 0.04 K/UL — SIGNIFICANT CHANGE UP (ref 0–0.2)
BASOPHILS NFR BLD AUTO: 0.4 % — SIGNIFICANT CHANGE UP (ref 0–1)
BILIRUB SERPL-MCNC: 0.2 MG/DL — SIGNIFICANT CHANGE UP (ref 0.2–1.2)
BILIRUB UR-MCNC: NEGATIVE — SIGNIFICANT CHANGE UP
BLD GP AB SCN SERPL QL: SIGNIFICANT CHANGE UP
BUN SERPL-MCNC: 23 MG/DL — HIGH (ref 10–20)
CALCIUM SERPL-MCNC: 9.1 MG/DL — SIGNIFICANT CHANGE UP (ref 8.5–10.1)
CHLORIDE SERPL-SCNC: 99 MMOL/L — SIGNIFICANT CHANGE UP (ref 98–110)
CO2 SERPL-SCNC: 22 MMOL/L — SIGNIFICANT CHANGE UP (ref 17–32)
COLOR SPEC: SIGNIFICANT CHANGE UP
CREAT SERPL-MCNC: 0.9 MG/DL — SIGNIFICANT CHANGE UP (ref 0.7–1.5)
DIFF PNL FLD: NEGATIVE — SIGNIFICANT CHANGE UP
EOSINOPHIL # BLD AUTO: 0.2 K/UL — SIGNIFICANT CHANGE UP (ref 0–0.7)
EOSINOPHIL NFR BLD AUTO: 2.1 % — SIGNIFICANT CHANGE UP (ref 0–8)
ETHANOL SERPL-MCNC: <10 MG/DL — SIGNIFICANT CHANGE UP
GLUCOSE SERPL-MCNC: 253 MG/DL — HIGH (ref 70–99)
GLUCOSE UR QL: ABNORMAL
HCT VFR BLD CALC: 32.9 % — LOW (ref 37–47)
HGB BLD-MCNC: 10.2 G/DL — LOW (ref 12–16)
IMM GRANULOCYTES NFR BLD AUTO: 0.7 % — HIGH (ref 0.1–0.3)
INR BLD: 1.3 RATIO — SIGNIFICANT CHANGE UP (ref 0.65–1.3)
KETONES UR-MCNC: NEGATIVE — SIGNIFICANT CHANGE UP
LACTATE SERPL-SCNC: 3 MMOL/L — HIGH (ref 0.7–2)
LEUKOCYTE ESTERASE UR-ACNC: NEGATIVE — SIGNIFICANT CHANGE UP
LIDOCAIN IGE QN: 10 U/L — SIGNIFICANT CHANGE UP (ref 7–60)
LYMPHOCYTES # BLD AUTO: 1.88 K/UL — SIGNIFICANT CHANGE UP (ref 1.2–3.4)
LYMPHOCYTES # BLD AUTO: 19.8 % — LOW (ref 20.5–51.1)
MCHC RBC-ENTMCNC: 27.2 PG — SIGNIFICANT CHANGE UP (ref 27–31)
MCHC RBC-ENTMCNC: 31 G/DL — LOW (ref 32–37)
MCV RBC AUTO: 87.7 FL — SIGNIFICANT CHANGE UP (ref 81–99)
MONOCYTES # BLD AUTO: 0.64 K/UL — HIGH (ref 0.1–0.6)
MONOCYTES NFR BLD AUTO: 6.7 % — SIGNIFICANT CHANGE UP (ref 1.7–9.3)
NEUTROPHILS # BLD AUTO: 6.67 K/UL — HIGH (ref 1.4–6.5)
NEUTROPHILS NFR BLD AUTO: 70.3 % — SIGNIFICANT CHANGE UP (ref 42.2–75.2)
NITRITE UR-MCNC: NEGATIVE — SIGNIFICANT CHANGE UP
NRBC # BLD: 0 /100 WBCS — SIGNIFICANT CHANGE UP (ref 0–0)
PH UR: 6.5 — SIGNIFICANT CHANGE UP (ref 5–8)
PLATELET # BLD AUTO: 186 K/UL — SIGNIFICANT CHANGE UP (ref 130–400)
POTASSIUM SERPL-MCNC: 4.8 MMOL/L — SIGNIFICANT CHANGE UP (ref 3.5–5)
POTASSIUM SERPL-SCNC: 4.8 MMOL/L — SIGNIFICANT CHANGE UP (ref 3.5–5)
PROT SERPL-MCNC: 6.1 G/DL — SIGNIFICANT CHANGE UP (ref 6–8)
PROT UR-MCNC: SIGNIFICANT CHANGE UP
PROTHROM AB SERPL-ACNC: 14.9 SEC — HIGH (ref 9.95–12.87)
RBC # BLD: 3.75 M/UL — LOW (ref 4.2–5.4)
RBC # FLD: 15.1 % — HIGH (ref 11.5–14.5)
SARS-COV-2 RNA SPEC QL NAA+PROBE: SIGNIFICANT CHANGE UP
SODIUM SERPL-SCNC: 135 MMOL/L — SIGNIFICANT CHANGE UP (ref 135–146)
SP GR SPEC: 1.04 — HIGH (ref 1.01–1.02)
UROBILINOGEN FLD QL: SIGNIFICANT CHANGE UP
WBC # BLD: 9.5 K/UL — SIGNIFICANT CHANGE UP (ref 4.8–10.8)
WBC # FLD AUTO: 9.5 K/UL — SIGNIFICANT CHANGE UP (ref 4.8–10.8)

## 2020-08-29 PROCEDURE — 74177 CT ABD & PELVIS W/CONTRAST: CPT | Mod: 26

## 2020-08-29 PROCEDURE — 70450 CT HEAD/BRAIN W/O DYE: CPT | Mod: 26

## 2020-08-29 PROCEDURE — 99285 EMERGENCY DEPT VISIT HI MDM: CPT | Mod: CS

## 2020-08-29 PROCEDURE — 73552 X-RAY EXAM OF FEMUR 2/>: CPT | Mod: 26,LT

## 2020-08-29 PROCEDURE — 71260 CT THORAX DX C+: CPT | Mod: 26

## 2020-08-29 PROCEDURE — 72170 X-RAY EXAM OF PELVIS: CPT | Mod: 26

## 2020-08-29 PROCEDURE — 72125 CT NECK SPINE W/O DYE: CPT | Mod: 26

## 2020-08-29 PROCEDURE — 73610 X-RAY EXAM OF ANKLE: CPT | Mod: 26,LT

## 2020-08-29 PROCEDURE — 93010 ELECTROCARDIOGRAM REPORT: CPT

## 2020-08-29 PROCEDURE — 73560 X-RAY EXAM OF KNEE 1 OR 2: CPT | Mod: 26,LT

## 2020-08-29 PROCEDURE — 71045 X-RAY EXAM CHEST 1 VIEW: CPT | Mod: 26

## 2020-08-29 RX ORDER — MORPHINE SULFATE 50 MG/1
4 CAPSULE, EXTENDED RELEASE ORAL ONCE
Refills: 0 | Status: DISCONTINUED | OUTPATIENT
Start: 2020-08-29 | End: 2020-08-29

## 2020-08-29 RX ADMIN — MORPHINE SULFATE 4 MILLIGRAM(S): 50 CAPSULE, EXTENDED RELEASE ORAL at 23:51

## 2020-08-29 RX ADMIN — MORPHINE SULFATE 4 MILLIGRAM(S): 50 CAPSULE, EXTENDED RELEASE ORAL at 23:35

## 2020-08-29 NOTE — ED PROVIDER NOTE - PHYSICAL EXAMINATION
CONSTITUTIONAL: Well-appearing; well-nourished; in no apparent distress. morbid obese female   EYES: PERRL; EOM intact.   ENT: normal nose; no rhinorrhea; normal pharynx with no tonsillar hypertrophy.   CARDIOVASCULAR: Normal S1, S2; no murmurs, rubs, or gallops.   RESPIRATORY: Normal chest excursion with respiration; breath sounds clear and equal bilaterally; no wheezes, rhonchi, or rales.  GI/: Normal bowel sounds; non-distended; non-tender; no palpable organomegaly.   MS: limited PE 2/2 morbid obesity. able to bend and move eight leg. unable to move left leg/knee/hip 2/2 pain. no focal bony tenderness. stasis changes to b/l LE.   SKIN: Normal for age and race; warm; dry; good turgor; no apparent lesions or exudate.   NEURO/PSYCH: A & O x 4; grossly unremarkable.

## 2020-08-29 NOTE — ED PROVIDER NOTE - OBJECTIVE STATEMENT
76 yo female hx of HTN/DM/HLD/Neuropathy/ chronic back pain/ morbid obesity/ Aortic stenosis s/p TAVR 9 months ago s/p pacemaker on Eliquis present c/o left leg s/p fall. as per patient, she was trying to get into her car from her scooter. She took one step and stand and try to get into the car. Her left leg got trap underneath the car and her family had to pull her leg pain. Her pain mostly to her knee/thigh and hip. reported she only able to stand for a minutes and take 1-2 step only. denies fever/chill/recent illness/chest pain/sob/abd pain/n/v/d.

## 2020-08-29 NOTE — ED ADULT TRIAGE NOTE - CHIEF COMPLAINT QUOTE
BIBA with complaints of left leg/thigh pain, S/P fall while getting into the car and left leg got caught under the car, did not hit head, on Eloquis & ASA

## 2020-08-29 NOTE — ED PROVIDER NOTE - CARE PLAN
Principal Discharge DX:	Inability to ambulate due to multiple joints  Secondary Diagnosis:	Intractable pain  Secondary Diagnosis:	Accidental fall

## 2020-08-29 NOTE — ED PROVIDER NOTE - NS ED ROS FT
Constitutional: no fever, chills, no recent weight loss, change in appetite or malaise  Eyes: no redness/discharge/pain/vision changes  ENT: no rhinorrhea/ear pain/sore throat  Cardiac: No chest pain, SOB or edema.  Respiratory: No cough or respiratory distress  GI: No nausea, vomiting, diarrhea or abdominal pain.  : No dysuria, frequency, urgency or hematuria  MS: see HPI  Neuro: No headache or weakness. No LOC.  Skin: No skin rash.  Endocrine: + diabetes.

## 2020-08-29 NOTE — ED PROVIDER NOTE - ATTENDING CONTRIBUTION TO CARE
74 yo f with pmh of TAVR, on eliquis, cardiac arrest, PPM, HTN, asthma, DM HLD, phlebitis, morbid obesity, presents with c/o L leg pain s/p slip from her scooter.  pt says she was trying to transfer to the car with help of  and she slipped and leg was caught under the car. pt says only her leg was touching the ground.  no head trauma, no neck pain.  pt however, having b/l chest pain and L hip pain.  pt says leg was pulled from under the car and she heard a crunching sound.  pt says having pain moving her L leg currently.  denies f/c, n/v/d, sob, cough.  exam: nad, ncat, perrl, eomi, mmm, rrr, ctab, +ppm L chest wall, abd soft, nt,nd, morbidly obese, L hip ttp, no LLE shortening, aox3, imp: pt with slipping off scooter and L hip/L leg pain.  pt has a limited exam due to habitus, on eliquis, will panscan, XR L extremity

## 2020-08-29 NOTE — ED PROVIDER NOTE - PROGRESS NOTE DETAILS
Ortho consult recommendations noted, discussed with Trauma, consult was placed , trauma had evaluated patient and cleared for admission to Medicine, written consult by trauma will follow soon. Patient is morbidly obese. patient has intractable left leg pain and unable to stand so she won't be able to return. she will requires hospitalization for PT/Rehab evaluation and possible placed to SNF.

## 2020-08-29 NOTE — ED PROVIDER NOTE - CLINICAL SUMMARY MEDICAL DECISION MAKING FREE TEXT BOX
Patient was signed out to me by Dr. Salomon, patient remained hemodynamically stable in ED, ortho/trauma consults obtained, patient is admitted medicine in stable condition for further evaluation and needed treatments.

## 2020-08-30 DIAGNOSIS — R09.89 OTHER SPECIFIED SYMPTOMS AND SIGNS INVOLVING THE CIRCULATORY AND RESPIRATORY SYSTEMS: ICD-10-CM

## 2020-08-30 LAB
GLUCOSE BLDC GLUCOMTR-MCNC: 215 MG/DL — HIGH (ref 70–99)
GLUCOSE BLDC GLUCOMTR-MCNC: 259 MG/DL — HIGH (ref 70–99)
GLUCOSE BLDC GLUCOMTR-MCNC: 265 MG/DL — HIGH (ref 70–99)
GLUCOSE BLDC GLUCOMTR-MCNC: 287 MG/DL — HIGH (ref 70–99)
GLUCOSE BLDC GLUCOMTR-MCNC: 295 MG/DL — HIGH (ref 70–99)
GLUCOSE BLDC GLUCOMTR-MCNC: 334 MG/DL — HIGH (ref 70–99)

## 2020-08-30 PROCEDURE — 99283 EMERGENCY DEPT VISIT LOW MDM: CPT

## 2020-08-30 PROCEDURE — 99223 1ST HOSP IP/OBS HIGH 75: CPT | Mod: AI

## 2020-08-30 RX ORDER — OXYCODONE HYDROCHLORIDE 5 MG/1
10 TABLET ORAL EVERY 4 HOURS
Refills: 0 | Status: DISCONTINUED | OUTPATIENT
Start: 2020-08-30 | End: 2020-09-01

## 2020-08-30 RX ORDER — APIXABAN 2.5 MG/1
5 TABLET, FILM COATED ORAL EVERY 12 HOURS
Refills: 0 | Status: DISCONTINUED | OUTPATIENT
Start: 2020-08-30 | End: 2020-09-01

## 2020-08-30 RX ORDER — PANTOPRAZOLE SODIUM 20 MG/1
40 TABLET, DELAYED RELEASE ORAL
Refills: 0 | Status: DISCONTINUED | OUTPATIENT
Start: 2020-08-30 | End: 2020-09-01

## 2020-08-30 RX ORDER — KETOROLAC TROMETHAMINE 30 MG/ML
15 SYRINGE (ML) INJECTION ONCE
Refills: 0 | Status: DISCONTINUED | OUTPATIENT
Start: 2020-08-30 | End: 2020-08-30

## 2020-08-30 RX ORDER — OXYCODONE AND ACETAMINOPHEN 5; 325 MG/1; MG/1
1 TABLET ORAL ONCE
Refills: 0 | Status: DISCONTINUED | OUTPATIENT
Start: 2020-08-30 | End: 2020-08-30

## 2020-08-30 RX ORDER — CEFAZOLIN SODIUM 1 G
2000 VIAL (EA) INJECTION EVERY 8 HOURS
Refills: 0 | Status: DISCONTINUED | OUTPATIENT
Start: 2020-08-30 | End: 2020-08-31

## 2020-08-30 RX ORDER — INSULIN LISPRO 100/ML
30 VIAL (ML) SUBCUTANEOUS
Refills: 0 | Status: DISCONTINUED | OUTPATIENT
Start: 2020-08-30 | End: 2020-09-01

## 2020-08-30 RX ORDER — ASPIRIN/CALCIUM CARB/MAGNESIUM 324 MG
81 TABLET ORAL DAILY
Refills: 0 | Status: DISCONTINUED | OUTPATIENT
Start: 2020-08-30 | End: 2020-09-01

## 2020-08-30 RX ORDER — INSULIN GLARGINE 100 [IU]/ML
55 INJECTION, SOLUTION SUBCUTANEOUS
Refills: 0 | Status: DISCONTINUED | OUTPATIENT
Start: 2020-08-30 | End: 2020-09-01

## 2020-08-30 RX ORDER — CHLORHEXIDINE GLUCONATE 213 G/1000ML
1 SOLUTION TOPICAL
Refills: 0 | Status: DISCONTINUED | OUTPATIENT
Start: 2020-08-30 | End: 2020-09-01

## 2020-08-30 RX ORDER — ACETAMINOPHEN 500 MG
650 TABLET ORAL ONCE
Refills: 0 | Status: COMPLETED | OUTPATIENT
Start: 2020-08-30 | End: 2020-08-30

## 2020-08-30 RX ADMIN — OXYCODONE HYDROCHLORIDE 10 MILLIGRAM(S): 5 TABLET ORAL at 23:18

## 2020-08-30 RX ADMIN — Medication 650 MILLIGRAM(S): at 19:48

## 2020-08-30 RX ADMIN — Medication 650 MILLIGRAM(S): at 20:00

## 2020-08-30 RX ADMIN — Medication 100 MILLIGRAM(S): at 21:09

## 2020-08-30 RX ADMIN — APIXABAN 5 MILLIGRAM(S): 2.5 TABLET, FILM COATED ORAL at 17:14

## 2020-08-30 RX ADMIN — CHLORHEXIDINE GLUCONATE 1 APPLICATION(S): 213 SOLUTION TOPICAL at 05:30

## 2020-08-30 RX ADMIN — INSULIN GLARGINE 55 UNIT(S): 100 INJECTION, SOLUTION SUBCUTANEOUS at 21:09

## 2020-08-30 RX ADMIN — APIXABAN 5 MILLIGRAM(S): 2.5 TABLET, FILM COATED ORAL at 05:30

## 2020-08-30 RX ADMIN — Medication 81 MILLIGRAM(S): at 11:26

## 2020-08-30 RX ADMIN — Medication 15 MILLIGRAM(S): at 01:20

## 2020-08-30 RX ADMIN — OXYCODONE HYDROCHLORIDE 10 MILLIGRAM(S): 5 TABLET ORAL at 08:40

## 2020-08-30 RX ADMIN — Medication 15 MILLIGRAM(S): at 01:12

## 2020-08-30 RX ADMIN — Medication 30 UNIT(S): at 17:14

## 2020-08-30 RX ADMIN — OXYCODONE AND ACETAMINOPHEN 1 TABLET(S): 5; 325 TABLET ORAL at 01:12

## 2020-08-30 RX ADMIN — Medication 30 UNIT(S): at 08:39

## 2020-08-30 RX ADMIN — Medication 30 UNIT(S): at 11:41

## 2020-08-30 RX ADMIN — INSULIN GLARGINE 55 UNIT(S): 100 INJECTION, SOLUTION SUBCUTANEOUS at 10:09

## 2020-08-30 RX ADMIN — Medication 100 MILLIGRAM(S): at 06:17

## 2020-08-30 RX ADMIN — OXYCODONE AND ACETAMINOPHEN 1 TABLET(S): 5; 325 TABLET ORAL at 02:00

## 2020-08-30 RX ADMIN — PANTOPRAZOLE SODIUM 40 MILLIGRAM(S): 20 TABLET, DELAYED RELEASE ORAL at 05:30

## 2020-08-30 RX ADMIN — Medication 100 MILLIGRAM(S): at 14:23

## 2020-08-30 RX ADMIN — OXYCODONE HYDROCHLORIDE 10 MILLIGRAM(S): 5 TABLET ORAL at 16:42

## 2020-08-30 RX ADMIN — OXYCODONE HYDROCHLORIDE 10 MILLIGRAM(S): 5 TABLET ORAL at 16:11

## 2020-08-30 RX ADMIN — OXYCODONE HYDROCHLORIDE 10 MILLIGRAM(S): 5 TABLET ORAL at 09:09

## 2020-08-30 NOTE — H&P ADULT - HISTORY OF PRESENT ILLNESS
Ms. Ross is a 76 y/o F with a PMH of HTN, DM on insulin pump, CHF, chronic afib on Eliquis, severe AS s/p TAVR 9 months ago, s/p permanent pacemaker implantation, R eye blindness (sudden onset 5 years ago), EMMA on CPAP, b/l rotator cuff injuries, chronic pain, total knee arthroplasty, and morbid obesity who presents for evaluation after a fall.    The patient is unable to ambulate at baseline and moves around with a scooter. While out shopping with her , she attempted to transfer from the scooter to the car and subsequently fell with her left leg being trapped under the car. The  called the fire department to help with freeing the patient and getting her into the car then an ambulance was called for transfer to the hospital. The patient had left leg pain from the knee down that she rated as 10/10 in intensity at that time, 8/10 now although her level of visible discomfort does not appear consistent with the numerical value she assigned to the pain. She denied losing consciousness, hitting her head, or sustaining other injuries in the fall. The patient had multiple mechanical falls at home but sustained no injuries.    The patient had a recent hospitalization for left leg cellulitis treated with IV antibiotics. She was discharged home Ms. Ross is a 74 y/o F with a PMH of HTN, DM on insulin pump, CHF, chronic afib on Eliquis, severe AS s/p TAVR 9 months ago, s/p permanent pacemaker implantation, R eye blindness (sudden onset 5 years ago), EMMA on CPAP, b/l rotator cuff injuries, chronic pain, total knee arthroplasty, and morbid obesity who presents for evaluation after a fall.    The patient is unable to ambulate at baseline and moves around with a scooter. While out shopping with her , she attempted to transfer from the scooter to the car and subsequently fell with her left leg being trapped under the car. The  called the fire department to help with freeing the patient and getting her into the car then an ambulance was called for transfer to the hospital. The patient had left leg pain from the knee down that she rated as 10/10 in intensity at that time, 8/10 now although her level of visible discomfort does not appear consistent with the numerical value she assigned to the pain. She denied losing consciousness, hitting her head, or sustaining other injuries in the fall. The patient had multiple mechanical falls at home but sustained no injuries.    The patient had a recent hospitalization for left leg cellulitis treated with IV antibiotics. She was discharged to a nursing facility then went home where she lives with her .

## 2020-08-30 NOTE — CONSULT NOTE ADULT - ASSESSMENT
Pt Name: LAUREN RAGSDALE  MRN: [id number]      HPI: 75yFemale presents with pain in left leg. Patient fell off her scooter earlier in the day.  Pain diffusely throughout entire leg. Non ambulatory at baseline. L TKA at [facility] 15 years ago. Denies antecedent pain.  Patient denies head trauma or LOC. Denies pain elsewhere.       PAST MEDICAL & SURGICAL HISTORY:  Pacemaker  Asthma  HTN (hypertension)  DM (diabetes mellitus)  History of pacemaker    Allergies: No Known Allergies    PHYSICAL EXAM:    Vital Signs Last 24 Hrs  T(C): 36.4 (29 Aug 2020 23:35), Max: 36.4 (29 Aug 2020 19:18)  T(F): 97.6 (29 Aug 2020 23:35), Max: 97.6 (29 Aug 2020 23:35)  HR: 66 (29 Aug 2020 23:35) (66 - 71)  BP: 153/69 (29 Aug 2020 23:35) (153/69 - 173/78)  BP(mean): --  RR: 20 (29 Aug 2020 19:18) (20 - 20)  SpO2: 96% (29 Aug 2020 19:18) (96% - 96%)    Physical Exam:  General: NAD, Alert, Awake and oriented. Morbidly obese.    LLE:   No open skin or wounds, TKA incision healed  PROM 20-80  knee stable to v/v, a/p drawer  Diffusely TTP knee/leg, edematous, chronic skin changes throughout  No pain with log-roll or axial compression  Sensation absent distally  EHL/FHL/TA/Gs motor intact.  BCR, wwp  Compartments soft and compressible.   No pain on passive stretch.    Labs:                        10.2   9.50  )-----------( 186      ( 29 Aug 2020 20:33 )             32.9     08-29    135  |  99  |  23<H>  ----------------------------<  253<H>  4.8   |  22  |  0.9    Ca    9.1      29 Aug 2020 20:33    TPro  6.1  /  Alb  3.9  /  TBili  0.2  /  DBili  x   /  AST  10  /  ALT  8   /  AlkPhos  83  08-29    PT/INR - ( 29 Aug 2020 20:33 )   PT: 14.90 sec;   INR: 1.30 ratio         PTT - ( 29 Aug 2020 20:33 )  PTT:31.3 sec    Imaging: No obvious fracture or dislocation.     A/P:    75y Female with left leg pain s/p TKA 15 years ago  -no orthopedic intervention. Implant well fixed on imaging and exam.  -pain control  -DVT ppx  -home medications  -consult ortho PRN. Upon discharge patient may follow up with Dr. Betts in 2 weeks.

## 2020-08-30 NOTE — H&P ADULT - ASSESSMENT
74 y/o F with a PMH of HTN, DM on insulin pump, CHF, chronic afib on Eliquis, severe AS s/p TAVR 9 months ago, s/p permanent pacemaker implantation, R eye blindness (sudden onset 5 years ago), EMMA on CPAP, b/l rotator cuff injuries, chronic pain, total knee arthroplasty, and morbid obesity who presents for evaluation after a fall.    #Fall  - Trauma workup negative for fractures or acute pathology.  - No intervention per orthopedics and trauma surgery.  - s/p total knee arthroplasty 15 years ago--stable on xray.  - Pain control.  - PT eval.    #Left leg pain and swelling--recurrent cellulitis vs DVT  - Patient had recent hospitalization for LLE cellulitis treated with IV vancomycin then transitioned to PO doxycycline.  - Patient is afebrile, no leukocytosis.  - Ancef 2 mg q8.  - f/u LE duplex.    #DM  - Patient has insulin pump but is unable to operate it-- is the one who can operate it.  - Disabled insulin pump and started on Lantus 55 units BID and Humalog 30 units TID (home dose; same dose last admission).    #Chronic paroxysmal afib  - Restarted home Eliquis.    #Chronic pain  - Patient has chronic back pain along with current LLE pain.  - s/p spinal fusion surgery.  - Oxycodone 10 mg q4.    Med rec not obtained. The patient does not know what medications she takes. Attempted to reach the  with no success. Please obtain a current med rec.      #DVT prophylaxis: Eliquis.  #GI prophylaxis: Protonix.  #Diet: consistent carbohydrates.  #Activity: increase as tolerated.  #Disposition: Acute; from home; awaiting PT eval.  #Code status: full code.

## 2020-08-30 NOTE — PHYSICAL THERAPY INITIAL EVALUATION ADULT - PERTINENT HX OF CURRENT PROBLEM, REHAB EVAL
76 y/o F with a PMH of HTN, DM on insulin pump, CHF, chronic afib on Eliquis, severe AS s/p TAVR 9 months ago, s/p permanent pacemaker implantation, R eye blindness (sudden onset 5 years ago), EMMA on CPAP, b/l rotator cuff injuries, chronic pain, total knee arthroplasty, and morbid obesity who presents for evaluation after a fall.

## 2020-08-30 NOTE — PHARMACOTHERAPY INTERVENTION NOTE - COMMENTS
s/w md- doses of insulin seemed to be on higher end but this is patient's home dose (they have an insulin pump at home) and these were the same doses on the last admission so orders are to be continued.

## 2020-08-30 NOTE — CHART NOTE - NSCHARTNOTEFT_GEN_A_CORE
Trauma tertiary Survey    Patient seen and examined. Sitting up in bed comfortably, eating dinner. Reports lower back pain and lower extremity but reports the pain is unchanged from prior to fall. Patient states this pain has been present for multiple years. Denies any additional areas of pain. No other traumatic injuries noted on exam.     PHYSICAL EXAM:  Craniofacial: Atraumatic, No deformity  Eyes: Pupil Size equal B/L and RTL. EOMI  Oropharynx: Atraumatic  Neck: Non-tender, No deformity, Trachea midline.  Chest: Equal breath sounds, non-tender, No deformity or crepitus  Heart: RSR, No murmurs, no rubs  Abdomen: Atraumatic, Non-tender, non-distended. No hepatosplenomegaly  Pelvis: Stable, non-tender, no deformity  : Atraumatic, no blood at the meatus or priapism  Back: Spine non-tender, Atraumatic  Extremities: Atraumatic, no deformities, normal pulses, moving all extremities   Neurologic: CN intact, Motor intact throughout. Sensation intact/normal throughout.    Alcohol, Blood: <10 mg/dL (08-29-20 @ 20:33)    All images/reports reviewed. No further traumatic work-up warranted.

## 2020-08-30 NOTE — H&P ADULT - NSHPSOCIALHISTORY_GEN_ALL_CORE
Lives at home with , completely dependent on  for all activities of daily living (clothing, cleaning, etc..). Has two daughters who do not live at home with her. The older daughter cerebral palsy with severe disability and lives with a caregiver family.

## 2020-08-30 NOTE — PHYSICAL THERAPY INITIAL EVALUATION ADULT - IMPAIRMENTS CONTRIBUTING IMPAIRED BED MOBILITY, REHAB EVAL
Pt. c/o 10/10 pain in LLE/impaired coordination/pain/impaired balance/decreased flexibility/decreased strength

## 2020-08-30 NOTE — PHYSICAL THERAPY INITIAL EVALUATION ADULT - ADDITIONAL COMMENTS
Pt. lives with her  and a 24 hr HHA lives with her. Pt. reports she has not walked in 10 days, and usually uses a rolling walker to ambulate in her house. Pt. utilizes motorized scooter for all other mobility. Pt. lives in a private house with 4 IRA.

## 2020-08-30 NOTE — PATIENT PROFILE ADULT - NSPROMEDSBROUGHTTOHOSP_GEN_A_NUR
Anesthesia POST Procedure Evaluation    Patient: Daysi Ashley   MRN:     [id number] Gender:   female   Age:    62 year old :      1956        Preoperative Diagnosis: * No pre-op diagnosis entered *   * No procedures listed *   Postop Comments: No value filed.       Anesthesia Type:  General    Reportable Event: NO     PAIN: Uncomplicated   Sign Out status: Comfortable, Well controlled pain     PONV: No PONV   Sign Out status:  No Nausea or Vomiting     Neuro/Psych: Uneventful perioperative course   Sign Out Status: Preoperative baseline; Age appropriate mentation     Airway/Resp.: Uneventful perioperative course   Sign Out Status: Non labored breathing, age appropriate RR; Resp. Status within EXPECTED Parameters     CV: Uneventful perioperative course   Sign Out status: Appropriate BP and perfusion indices; Appropriate HR/Rhythm     Disposition:   Sign Out in:  PACU  Disposition:  Phase II; Home  Recovery Course: Uneventful  Follow-Up: Not required           Last Anesthesia Record Vitals:  CRNA VITALS  2018 1237 - 2018 1337      2018             Pulse:  74    Ht Rate:  77    SpO2:  100 %    Resp Rate (set):  8          Last PACU/Preop Vitals:  Vitals:    18 0845 18 0900 18 0915   BP: 136/90 134/73    Pulse: 120 102    Resp: 20 20 18   Temp:  36.4  C (97.6  F) 36.5  C (97.7  F)   SpO2: 94% 93% 97%         Electronically Signed By: Gary Villa MD, MD, 2018, 9:47 AM  
no

## 2020-08-30 NOTE — CONSULT NOTE ADULT - ASSESSMENT
ASSESSMENT:  75y old f s/p fall from scooter, -ht, -loc, -ac. Trauma consulted for ct scan finding of contour irregularities of left lateral 9-10 ribs, fractures could not be excluded and difficulty ambulating which is her baseline (uses scooter for mobilization). The patient has no left chest wall tenderness, Orthopedics saw and cleared patient for left lower extremity pain. No further trauma work up warranted, disposition as per ED. ASSESSMENT:  75y old f s/p fall from scooter, -ht, -loc, -ac. Trauma consulted for ct scan finding of contour irregularities of left lateral 9-10 ribs, fractures could not be excluded and difficulty ambulating which is her baseline (uses scooter for mobilization). The patient has no left chest wall tenderness, Orthopedics saw and cleared patient for left lower extremity pain. No further trauma work up warranted, disposition as per ED.     There was no fall event as per the patient she did not fall to the ground, she was able to catch herself at the edge of her car after transferring from her motorized scooter and therefore did not strike the ground or hither head/body. Her L leg is what she used to prevent herself from falling and was not trapped beneath the car. The patient presents with no chest pain or shortness of breath and did not hit her chest or back. She has no new back pain. There is no SOB or dyspnea, she remains stable from a respiratory standpoint. On exam there is no chest or back tenderness. Therefore, based on the above information, the incidental findings on her CT chest are not new traumatic rib fractures and she has no new fractures of her L leg or knee, there is no appreciable soft tissue trauma to the L leg as well.     Plan for no further imaging workup. Orthopedics confirms no additional workup for her leg. No trauma intervention. Tertiary survey.    Senior Surgical Resident Note  I have edited the above note and agree with the current treatment plan  Above plan was discussed with Dr. Choudhary, patient, and the ED team  ---------------------------------------------------------------------------------------  08-30-20 @ 03:17

## 2020-08-30 NOTE — PHYSICAL THERAPY INITIAL EVALUATION ADULT - RANGE OF MOTION EXAMINATION, REHAB EVAL
BUE <90 degrees of shoulder flexion, RLE WNL, LLE decreased ROM by >75%, pt. reports she has neuropathy and has a very difficult time moving her RLE/deficits as listed below

## 2020-08-30 NOTE — H&P ADULT - NSHPSOURCEINFORD_GEN_ALL_CORE
Patient Patient up talking to family members at bedside, she is in no acute distress at this time, will continue to monitor

## 2020-08-30 NOTE — PROGRESS NOTE ADULT - SUBJECTIVE AND OBJECTIVE BOX
Orthopaedics Progress Note    LAUREN RAGSDALE  [id number]    Patient seen and examined bedside.  Pain well controlled, Tolerating PO intake. No other complaints. Denies nausea/vomiting, fever/chills, chest pain/SOB.    apixaban 5 milliGRAM(s) Oral every 12 hours  aspirin  chewable 81 milliGRAM(s) Oral daily  ceFAZolin   IVPB 2000 milliGRAM(s) IV Intermittent every 8 hours  chlorhexidine 4% Liquid 1 Application(s) Topical <User Schedule>  insulin glargine Injectable (LANTUS) 55 Unit(s) SubCutaneous two times a day  insulin lispro Injectable (HumaLOG) 30 Unit(s) SubCutaneous three times a day before meals  oxyCODONE    IR 10 milliGRAM(s) Oral every 4 hours PRN  pantoprazole    Tablet 40 milliGRAM(s) Oral before breakfast      T(C): 36.1 (08-30-20 @ 07:55), Max: 36.4 (08-29-20 @ 19:18)  HR: 66 (08-30-20 @ 07:55) (66 - 71)  BP: 135/66 (08-30-20 @ 07:55) (126/52 - 173/78)  RR: 18 (08-30-20 @ 07:55) (18 - 20)  SpO2: 95% (08-30-20 @ 07:55) (95% - 96%)    Physical Exam  General: NAD, Alert, Awake and oriented. Morbidly obese.    LLE:   No open skin or wounds, TKA incision healed  PROM 20-80  knee stable to v/v, a/p drawer  Diffusely TTP knee/leg, edematous, chronic skin changes throughout  No pain with log-roll or axial compression  Sensation absent distally  EHL/FHL/TA/Gs motor intact.  BCR, wwp  Compartments soft and compressible.   No pain on passive stretch.    Labs                        10.2   9.50  )-----------( 186      ( 29 Aug 2020 20:33 )             32.9     08-29    135  |  99  |  23<H>  ----------------------------<  253<H>  4.8   |  22  |  0.9    Ca    9.1      29 Aug 2020 20:33    TPro  6.1  /  Alb  3.9  /  TBili  0.2  /  DBili  x   /  AST  10  /  ALT  8   /  AlkPhos  83  08-29    LIVER FUNCTIONS - ( 29 Aug 2020 20:33 )  Alb: 3.9 g/dL / Pro: 6.1 g/dL / ALK PHOS: 83 U/L / ALT: 8 U/L / AST: 10 U/L / GGT: x           PT/INR - ( 29 Aug 2020 20:33 )   PT: 14.90 sec;   INR: 1.30 ratio         PTT - ( 29 Aug 2020 20:33 )  PTT:31.3 sec    A/P:    75y Female with left leg pain s/p TKA 15 years ago  -no orthopedic intervention indicated at this time. Implant well fixed on imaging and exam, may have possible medial sided chronic polyethylene wear.  -pain control  -DVT ppx  - f/u LE duplex  -home medications  -consult ortho PRN. Upon discharge patient may follow up with Dr. Betts in 2 weeks at [address], must call  for appointment

## 2020-08-30 NOTE — PHYSICAL THERAPY INITIAL EVALUATION ADULT - GENERAL OBSERVATIONS, REHAB EVAL
Pt. encountered alert and NAD, supine in bed. (+)primafit, (+)IV lock, (+)O2 NC 2L.min, agreeable to PT Eval. (+)ace wrap over R ankle

## 2020-08-30 NOTE — H&P ADULT - NSHPLABSRESULTS_GEN_ALL_CORE
LABS/RADIOLOGY RESULTS:                          10.2   9.50  )-----------( 186      ( 29 Aug 2020 20:33 )             32.9       135  |  99  |  23<H>  ----------------------------<  253<H>  4.8   |  22  |  0.9    Ca    9.1      29 Aug 2020 20:33    TPro  6.1  /  Alb  3.9  /  TBili  0.2  /  DBili  x   /  AST  10  /  ALT  8   /  AlkPhos  83    PT/INR - ( 29 Aug 2020 20:33 )   PT: 14.90 sec;   INR: 1.30 ratio         PTT - ( 29 Aug 2020 20:33 )  PTT:31.3 secBlood Cultures    Urinalysis Basic - ( 29 Aug 2020 23:00 )    Color: Light Yellow / Appearance: Clear / S.040 / pH:   Gluc:  / Ketone: Negative  / Bili: Negative / Urobili: <2 mg/dL   Blood:  / Protein: Trace / Nitrite: Negative   Leuk Esterase: Negative / RBC:  / WBC    Sq Epi:  / Non Sq Epi:  / Bacteria:

## 2020-08-30 NOTE — H&P ADULT - NSHPPHYSICALEXAM_GEN_ALL_CORE
General: Morbidly obese, NAD.  HEENT: R eye blindness, abnormal pupillary light reflex.   Cardiac: RRR, aortic systolic murmur present.  Pulmonary: CTAB.  Abdomen: Non-tender, +BS.  Extremities: Redness, swelling, and tenderness over left shin and dorsum of the left foot. 2+ pitting edema b/l.   Neuro: AAOx3, decreased sensation in b/l LE.

## 2020-08-30 NOTE — H&P ADULT - ATTENDING COMMENTS
Patient was seen independently. Latest vital signs and labs were reviewed. Case was discussed with house staff. Agree with resident's assessment and plan with following additions/modifications.       Assessment :  Mechanical fall, no fracture  Diabetes mellitis type 2   chronic Atrial fibrillation , on anti-coagulation with Eliquis  severe AS s/p TAVR  Obstructive sleep apnea , on cpap  Osteoarthritis , status post knee arthroplasty  Morbid obesity      Plan:   Trauma work up negative for acute fracture  Hardware in place without any complication  Endocrine consult for inulin pump  PT/Rehab consult.   follow up doppler to rule out Deep venous thrombosis   Low concern for Cellulitis - d/c antibiotics   Diabetes mellitis type 2 - endocrine consult  Insulin sliding scale with coverage with meals and QHS   Pain management   Bowel regimen  Electrolyte supplementation and follow up with BMP. Keep K+>4, Mg>2   chronic Atrial fibrillation - continue with anti-coagulation with Eliquis  Tried reaching  for med rec - no response

## 2020-08-30 NOTE — PHYSICAL THERAPY INITIAL EVALUATION ADULT - LEVEL OF INDEPENDENCE: BED TO CHAIR, REHAB EVAL
Pt. unable to maintain long sitting or short sitting position at EOB secondary to increased pain and weakness/unable to perform

## 2020-08-31 LAB
ALBUMIN SERPL ELPH-MCNC: 3.6 G/DL — SIGNIFICANT CHANGE UP (ref 3.5–5.2)
ALP SERPL-CCNC: 80 U/L — SIGNIFICANT CHANGE UP (ref 30–115)
ALT FLD-CCNC: 6 U/L — SIGNIFICANT CHANGE UP (ref 0–41)
AMPHET UR-MCNC: NEGATIVE — SIGNIFICANT CHANGE UP
ANION GAP SERPL CALC-SCNC: 12 MMOL/L — SIGNIFICANT CHANGE UP (ref 7–14)
AST SERPL-CCNC: 10 U/L — SIGNIFICANT CHANGE UP (ref 0–41)
BARBITURATES UR SCN-MCNC: NEGATIVE — SIGNIFICANT CHANGE UP
BASOPHILS # BLD AUTO: 0.02 K/UL — SIGNIFICANT CHANGE UP (ref 0–0.2)
BASOPHILS NFR BLD AUTO: 0.2 % — SIGNIFICANT CHANGE UP (ref 0–1)
BENZODIAZ UR-MCNC: NEGATIVE — SIGNIFICANT CHANGE UP
BILIRUB SERPL-MCNC: <0.2 MG/DL — SIGNIFICANT CHANGE UP (ref 0.2–1.2)
BUN SERPL-MCNC: 12 MG/DL — SIGNIFICANT CHANGE UP (ref 10–20)
CALCIUM SERPL-MCNC: 8.9 MG/DL — SIGNIFICANT CHANGE UP (ref 8.5–10.1)
CHLORIDE SERPL-SCNC: 101 MMOL/L — SIGNIFICANT CHANGE UP (ref 98–110)
CO2 SERPL-SCNC: 26 MMOL/L — SIGNIFICANT CHANGE UP (ref 17–32)
COCAINE METAB.OTHER UR-MCNC: NEGATIVE — SIGNIFICANT CHANGE UP
CREAT SERPL-MCNC: 0.7 MG/DL — SIGNIFICANT CHANGE UP (ref 0.7–1.5)
DRUG SCREEN 1, URINE RESULT: SIGNIFICANT CHANGE UP
EOSINOPHIL # BLD AUTO: 0.1 K/UL — SIGNIFICANT CHANGE UP (ref 0–0.7)
EOSINOPHIL NFR BLD AUTO: 1.2 % — SIGNIFICANT CHANGE UP (ref 0–8)
GLUCOSE BLDC GLUCOMTR-MCNC: 131 MG/DL — HIGH (ref 70–99)
GLUCOSE BLDC GLUCOMTR-MCNC: 164 MG/DL — HIGH (ref 70–99)
GLUCOSE BLDC GLUCOMTR-MCNC: 228 MG/DL — HIGH (ref 70–99)
GLUCOSE BLDC GLUCOMTR-MCNC: 237 MG/DL — HIGH (ref 70–99)
GLUCOSE SERPL-MCNC: 124 MG/DL — HIGH (ref 70–99)
HCT VFR BLD CALC: 33.1 % — LOW (ref 37–47)
HGB BLD-MCNC: 10.3 G/DL — LOW (ref 12–16)
IMM GRANULOCYTES NFR BLD AUTO: 0.6 % — HIGH (ref 0.1–0.3)
LYMPHOCYTES # BLD AUTO: 1.14 K/UL — LOW (ref 1.2–3.4)
LYMPHOCYTES # BLD AUTO: 13.3 % — LOW (ref 20.5–51.1)
MAGNESIUM SERPL-MCNC: 1.7 MG/DL — LOW (ref 1.8–2.4)
MCHC RBC-ENTMCNC: 26.8 PG — LOW (ref 27–31)
MCHC RBC-ENTMCNC: 31.1 G/DL — LOW (ref 32–37)
MCV RBC AUTO: 86 FL — SIGNIFICANT CHANGE UP (ref 81–99)
METHADONE UR-MCNC: NEGATIVE — SIGNIFICANT CHANGE UP
MONOCYTES # BLD AUTO: 0.49 K/UL — SIGNIFICANT CHANGE UP (ref 0.1–0.6)
MONOCYTES NFR BLD AUTO: 5.7 % — SIGNIFICANT CHANGE UP (ref 1.7–9.3)
NEUTROPHILS # BLD AUTO: 6.8 K/UL — HIGH (ref 1.4–6.5)
NEUTROPHILS NFR BLD AUTO: 79 % — HIGH (ref 42.2–75.2)
NRBC # BLD: 0 /100 WBCS — SIGNIFICANT CHANGE UP (ref 0–0)
OPIATES UR-MCNC: POSITIVE
PCP UR-MCNC: NEGATIVE — SIGNIFICANT CHANGE UP
PLATELET # BLD AUTO: 191 K/UL — SIGNIFICANT CHANGE UP (ref 130–400)
POTASSIUM SERPL-MCNC: 4.5 MMOL/L — SIGNIFICANT CHANGE UP (ref 3.5–5)
POTASSIUM SERPL-SCNC: 4.5 MMOL/L — SIGNIFICANT CHANGE UP (ref 3.5–5)
PROPOXYPHENE QUALITATIVE URINE RESULT: NEGATIVE — SIGNIFICANT CHANGE UP
PROT SERPL-MCNC: 5.8 G/DL — LOW (ref 6–8)
RBC # BLD: 3.85 M/UL — LOW (ref 4.2–5.4)
RBC # FLD: 15.2 % — HIGH (ref 11.5–14.5)
SODIUM SERPL-SCNC: 139 MMOL/L — SIGNIFICANT CHANGE UP (ref 135–146)
THC UR QL: NEGATIVE — SIGNIFICANT CHANGE UP
WBC # BLD: 8.6 K/UL — SIGNIFICANT CHANGE UP (ref 4.8–10.8)
WBC # FLD AUTO: 8.6 K/UL — SIGNIFICANT CHANGE UP (ref 4.8–10.8)

## 2020-08-31 PROCEDURE — 99233 SBSQ HOSP IP/OBS HIGH 50: CPT

## 2020-08-31 RX ADMIN — Medication 30 UNIT(S): at 08:11

## 2020-08-31 RX ADMIN — OXYCODONE HYDROCHLORIDE 10 MILLIGRAM(S): 5 TABLET ORAL at 06:02

## 2020-08-31 RX ADMIN — OXYCODONE HYDROCHLORIDE 10 MILLIGRAM(S): 5 TABLET ORAL at 15:39

## 2020-08-31 RX ADMIN — OXYCODONE HYDROCHLORIDE 10 MILLIGRAM(S): 5 TABLET ORAL at 20:06

## 2020-08-31 RX ADMIN — CHLORHEXIDINE GLUCONATE 1 APPLICATION(S): 213 SOLUTION TOPICAL at 06:03

## 2020-08-31 RX ADMIN — INSULIN GLARGINE 55 UNIT(S): 100 INJECTION, SOLUTION SUBCUTANEOUS at 09:35

## 2020-08-31 RX ADMIN — Medication 100 MILLIGRAM(S): at 05:30

## 2020-08-31 RX ADMIN — Medication 30 UNIT(S): at 17:12

## 2020-08-31 RX ADMIN — APIXABAN 5 MILLIGRAM(S): 2.5 TABLET, FILM COATED ORAL at 05:30

## 2020-08-31 RX ADMIN — Medication 81 MILLIGRAM(S): at 11:59

## 2020-08-31 RX ADMIN — INSULIN GLARGINE 55 UNIT(S): 100 INJECTION, SOLUTION SUBCUTANEOUS at 21:10

## 2020-08-31 RX ADMIN — PANTOPRAZOLE SODIUM 40 MILLIGRAM(S): 20 TABLET, DELAYED RELEASE ORAL at 08:11

## 2020-08-31 RX ADMIN — APIXABAN 5 MILLIGRAM(S): 2.5 TABLET, FILM COATED ORAL at 17:13

## 2020-08-31 RX ADMIN — OXYCODONE HYDROCHLORIDE 10 MILLIGRAM(S): 5 TABLET ORAL at 16:04

## 2020-08-31 RX ADMIN — Medication 30 UNIT(S): at 11:58

## 2020-08-31 RX ADMIN — OXYCODONE HYDROCHLORIDE 10 MILLIGRAM(S): 5 TABLET ORAL at 20:55

## 2020-08-31 NOTE — PROGRESS NOTE ADULT - SUBJECTIVE AND OBJECTIVE BOX
Progress Note:  Provider Speciality                            Hospitalist      LAUREN ROSS [id number] 75y Female     CHIEF PRESENTING COMPLAINT:  Patient is a 75y old  Female who presents with a chief complaint of Fall (30 Aug 2020 11:07)        SUBJECTIVE:  Patient was seen and examined at bedside. Reports generalized pain & lethargy   No significant overnight events reported.     HISTORY OF PRESENTING ILLNESS:  HPI:  Ms. Ross is a 76 y/o F with a PMH of HTN, DM on insulin pump, CHF, chronic afib on Eliquis, severe AS s/p TAVR 9 months ago, s/p permanent pacemaker implantation, R eye blindness (sudden onset 5 years ago), EMMA on CPAP, b/l rotator cuff injuries, chronic pain, total knee arthroplasty, and morbid obesity who presents for evaluation after a fall.    The patient is unable to ambulate at baseline and moves around with a scooter. While out shopping with her , she attempted to transfer from the scooter to the car and subsequently fell with her left leg being trapped under the car. The  called the fire department to help with freeing the patient and getting her into the car then an ambulance was called for transfer to the hospital. The patient had left leg pain from the knee down that she rated as 10/10 in intensity at that time, 8/10 now although her level of visible discomfort does not appear consistent with the numerical value she assigned to the pain. She denied losing consciousness, hitting her head, or sustaining other injuries in the fall. The patient had multiple mechanical falls at home but sustained no injuries.    The patient had a recent hospitalization for left leg cellulitis treated with IV antibiotics. She was discharged to a nursing facility then went home where she lives with her . (30 Aug 2020 04:48)        REVIEW OF SYSTEMS:  Patient denies any headache, any vision complaints, runny nose, fever, chills, sore throat. Denies chest pain, shortness of breath, palpitation. Denies nausea, vomiting, abdominal pain, diarrhoea, Denies urinary burning, urgency, frequency, dysuria. Denies weakness in any part of the body or numbness.   At least 10 systems were reviewed in ROS. All systems reviewed  are within normal limits except for the complaints as described in Subjective.    PAST MEDICAL & SURGICAL HISTORY:  PAST MEDICAL & SURGICAL HISTORY:  Pacemaker  Asthma  HTN (hypertension)  DM (diabetes mellitus)  History of pacemaker          VITAL SIGNS:  Vital Signs Last 24 Hrs  T(C): 35.8 (31 Aug 2020 07:44), Max: 37.3 (31 Aug 2020 00:00)  T(F): 96.5 (31 Aug 2020 07:44), Max: 99.2 (31 Aug 2020 00:00)  HR: 76 (31 Aug 2020 07:44) (65 - 76)  BP: 158/67 (31 Aug 2020 07:44) (132/61 - 158/67)  BP(mean): --  RR: 18 (31 Aug 2020 07:44) (18 - 18)  SpO2: --        PHYSICAL EXAMINATION:  Not in acute distress, morbid opbesity  General: No pallor, no icterus  HEENT:   EOMI, no JVD.  Heart: S1+S2 audible  Lungs: bilateral  fair air entry, no wheezing, no crepitations.  Abdomen: Soft, non-tender, non-distended , no  rigidity or guarding.  CNS: Awake alert, CN  grossly intact.  Extremities:  No edema            CONSULTS:  Consultant(s) Notes Reviewed by me.   Care Discussed with Consultants/Other Providers where required.        MEDICATIONS:  MEDICATIONS  (STANDING):  apixaban 5 milliGRAM(s) Oral every 12 hours  aspirin  chewable 81 milliGRAM(s) Oral daily  chlorhexidine 4% Liquid 1 Application(s) Topical <User Schedule>  insulin glargine Injectable (LANTUS) 55 Unit(s) SubCutaneous two times a day  insulin lispro Injectable (HumaLOG) 30 Unit(s) SubCutaneous three times a day before meals  pantoprazole    Tablet 40 milliGRAM(s) Oral before breakfast    MEDICATIONS  (PRN):  oxyCODONE    IR 10 milliGRAM(s) Oral every 4 hours PRN Moderate Pain (4 - 6)            ASSESSMENT:        Patient was seen independently. Latest vital signs and labs were reviewed. Case was discussed with house staff. Agree with resident's assessment and plan     with following additions/modifications.       Assessment :  Mechanical fall, no fracture  Diabetes mellitis type 2   chronic Atrial fibrillation , on anti-coagulation with Eliquis  severe AS s/p TAVR  Obstructive sleep apnea , on cpap  Osteoarthritis , status post knee arthroplasty  Morbid obesity      Plan:   Trauma work up negative for acute fracture  Hardware in place without any complication  Endocrine consult for inulin pump. For now increase lantus 60 milligrams twice daily & lispro 35 units three time daily   PT/Rehab consult.   follow up doppler to rule out Deep venous thrombosis   Low concern for Cellulitis - d/c antibiotics   Diabetes mellitis type 2 - endocrine consult  Insulin sliding scale with coverage with meals and QHS   Pain management   Bowel regimen  Electrolyte supplementation and follow up with BMP. Keep K+>4, Mg>2   chronic Atrial fibrillation - continue with anti-coagulation with Eliquis  Discharge Disposition: Anticipated discharge tomorrow to [facility] pending PT recs. If anticipated fro [facility] then COVID today

## 2020-08-31 NOTE — PROGRESS NOTE ADULT - ASSESSMENT
76 y/o F with a PMH of HTN, DM on insulin pump, CHF, chronic afib on Eliquis, severe AS s/p TAVR 9 months ago, s/p permanent pacemaker implantation, R eye blindness (sudden onset 5 years ago), EMMA on CPAP, b/l rotator cuff injuries, chronic pain, total knee arthroplasty, and morbid obesity who presents for evaluation after a fall.    #Fall  - Trauma workup negative for fractures or acute pathology.  - No intervention per orthopedics and trauma surgery.  - s/p total knee arthroplasty 15 years ago--stable on xray.  - Pain control.  - PT eval. noted, patient would benefit from discharge to rehab facility    #Left leg pain and swelling--recurrent cellulitis vs DVT  - Patient had recent hospitalization for LLE cellulitis treated with IV vancomycin then transitioned to PO doxycycline.  - Patient is afebrile, no leukocytosis.    #DM  - Patient has insulin pump but is unable to operate it-- is the one who can operate it.  - Patient's endocrinologist Dr Victoria contacted via office number [phone], unclear who prescribed the patient's insulin pump.   - Patient went to [place] for many months and has not being following up with Dr Victoria for some time now.  - Disabled insulin pump and started on Lantus 55 units BID and Humalog 30 units TID (home dose; same dose last admission).  - Discussed with Dr Victoria, patient can see him at his outpatient office upon discharge.    #Chronic paroxysmal afib  - Restarted home Eliquis.    #Chronic pain  - Patient has chronic back pain along with current LLE pain.  - Discussed case with patient's endocrinologist, patient has extensive history of drug seeking behaviour  - c/w Oxycodone 10 mg q4 PRN  -  patient against stronger pain medications and risk of addiction        #DVT prophylaxis: Eliquis.  #GI prophylaxis: Protonix.  #Diet: consistent carbohydrates.  #Activity: increase as tolerated.  #Disposition: Rehabilitation Facility  #Code status: full code.

## 2020-08-31 NOTE — PROGRESS NOTE ADULT - SUBJECTIVE AND OBJECTIVE BOX
Patient seen and examined at bedside. No acute events overnight. Patient complaining of continued severe pain in LLE, non-localized and endorses that it feels sharp, stabbing, dull. Patient counseled against escalting to stronger pain management medications unless absolutely necessary.    PAST MEDICAL & SURGICAL HISTORY  Pacemaker  Asthma  HTN (hypertension)  DM (diabetes mellitus)  History of pacemaker      ALLERGIES:  No Known Allergies    MEDICATIONS:  STANDING MEDICATIONS  apixaban 5 milliGRAM(s) Oral every 12 hours  aspirin  chewable 81 milliGRAM(s) Oral daily  chlorhexidine 4% Liquid 1 Application(s) Topical <User Schedule>  insulin glargine Injectable (LANTUS) 55 Unit(s) SubCutaneous two times a day  insulin lispro Injectable (HumaLOG) 30 Unit(s) SubCutaneous three times a day before meals  pantoprazole    Tablet 40 milliGRAM(s) Oral before breakfast    PRN MEDICATIONS  oxyCODONE    IR 10 milliGRAM(s) Oral every 4 hours PRN    VITALS:   T(F): 96.5  HR: 76  BP: 158/67  RR: 18  SpO2: --    LABS:                        10.2   9.50  )-----------( 186      ( 29 Aug 2020 20:33 )             32.9     08-    135  |  99  |  23<H>  ----------------------------<  253<H>  4.8   |  22  |  0.9    Ca    9.1      29 Aug 2020 20:33    TPro  6.1  /  Alb  3.9  /  TBili  0.2  /  DBili  x   /  AST  10  /  ALT  8   /  AlkPhos  83      PT/INR - ( 29 Aug 2020 20:33 )   PT: 14.90 sec;   INR: 1.30 ratio         PTT - ( 29 Aug 2020 20:33 )  PTT:31.3 sec  Urinalysis Basic - ( 29 Aug 2020 23:00 )    Color: Light Yellow / Appearance: Clear / S.040 / pH: x  Gluc: x / Ketone: Negative  / Bili: Negative / Urobili: <2 mg/dL   Blood: x / Protein: Trace / Nitrite: Negative   Leuk Esterase: Negative / RBC: x / WBC x   Sq Epi: x / Non Sq Epi: x / Bacteria: x      PHYSICAL EXAM:  GEN: No acute distress  LUNGS: Clear to auscultation bilaterally. No pain on palpation.  HEART: Regular  ABD: Soft, non-tender, non-distended.  EXT: NC/NC/NE/2+PP/BURCIAGA/Skin Intact. Pain on palpation of LLE throughout  NEURO: AAOX3    Intravenous access:   NG tube:   Chapin Catheter:

## 2020-09-01 ENCOUNTER — TRANSCRIPTION ENCOUNTER (OUTPATIENT)
Age: 75
End: 2020-09-01

## 2020-09-01 VITALS
DIASTOLIC BLOOD PRESSURE: 78 MMHG | SYSTOLIC BLOOD PRESSURE: 169 MMHG | TEMPERATURE: 97 F | RESPIRATION RATE: 19 BRPM | HEART RATE: 88 BPM

## 2020-09-01 LAB
ALBUMIN SERPL ELPH-MCNC: 3.4 G/DL — LOW (ref 3.5–5.2)
ALP SERPL-CCNC: 76 U/L — SIGNIFICANT CHANGE UP (ref 30–115)
ALT FLD-CCNC: 5 U/L — SIGNIFICANT CHANGE UP (ref 0–41)
ANION GAP SERPL CALC-SCNC: 12 MMOL/L — SIGNIFICANT CHANGE UP (ref 7–14)
AST SERPL-CCNC: 11 U/L — SIGNIFICANT CHANGE UP (ref 0–41)
BILIRUB SERPL-MCNC: 0.3 MG/DL — SIGNIFICANT CHANGE UP (ref 0.2–1.2)
BUN SERPL-MCNC: 8 MG/DL — LOW (ref 10–20)
CALCIUM SERPL-MCNC: 9 MG/DL — SIGNIFICANT CHANGE UP (ref 8.5–10.1)
CHLORIDE SERPL-SCNC: 103 MMOL/L — SIGNIFICANT CHANGE UP (ref 98–110)
CO2 SERPL-SCNC: 26 MMOL/L — SIGNIFICANT CHANGE UP (ref 17–32)
CREAT SERPL-MCNC: 0.7 MG/DL — SIGNIFICANT CHANGE UP (ref 0.7–1.5)
GLUCOSE BLDC GLUCOMTR-MCNC: 117 MG/DL — HIGH (ref 70–99)
GLUCOSE BLDC GLUCOMTR-MCNC: 177 MG/DL — HIGH (ref 70–99)
GLUCOSE BLDC GLUCOMTR-MCNC: 193 MG/DL — HIGH (ref 70–99)
GLUCOSE SERPL-MCNC: 172 MG/DL — HIGH (ref 70–99)
HCT VFR BLD CALC: 32.6 % — LOW (ref 37–47)
HGB BLD-MCNC: 10 G/DL — LOW (ref 12–16)
INR BLD: 1.2 RATIO — SIGNIFICANT CHANGE UP (ref 0.65–1.3)
MAGNESIUM SERPL-MCNC: 1.6 MG/DL — LOW (ref 1.8–2.4)
MCHC RBC-ENTMCNC: 26.8 PG — LOW (ref 27–31)
MCHC RBC-ENTMCNC: 30.7 G/DL — LOW (ref 32–37)
MCV RBC AUTO: 87.4 FL — SIGNIFICANT CHANGE UP (ref 81–99)
NRBC # BLD: 0 /100 WBCS — SIGNIFICANT CHANGE UP (ref 0–0)
PLATELET # BLD AUTO: 179 K/UL — SIGNIFICANT CHANGE UP (ref 130–400)
POTASSIUM SERPL-MCNC: 4 MMOL/L — SIGNIFICANT CHANGE UP (ref 3.5–5)
POTASSIUM SERPL-SCNC: 4 MMOL/L — SIGNIFICANT CHANGE UP (ref 3.5–5)
PROT SERPL-MCNC: 5.5 G/DL — LOW (ref 6–8)
PROTHROM AB SERPL-ACNC: 13.8 SEC — HIGH (ref 9.95–12.87)
RBC # BLD: 3.73 M/UL — LOW (ref 4.2–5.4)
RBC # FLD: 15.3 % — HIGH (ref 11.5–14.5)
SODIUM SERPL-SCNC: 141 MMOL/L — SIGNIFICANT CHANGE UP (ref 135–146)
WBC # BLD: 9.1 K/UL — SIGNIFICANT CHANGE UP (ref 4.8–10.8)
WBC # FLD AUTO: 9.1 K/UL — SIGNIFICANT CHANGE UP (ref 4.8–10.8)

## 2020-09-01 PROCEDURE — 99239 HOSP IP/OBS DSCHRG MGMT >30: CPT

## 2020-09-01 RX ORDER — ACETAMINOPHEN 500 MG
650 TABLET ORAL EVERY 6 HOURS
Refills: 0 | Status: COMPLETED | OUTPATIENT
Start: 2020-09-01 | End: 2020-09-01

## 2020-09-01 RX ORDER — MAGNESIUM SULFATE 500 MG/ML
2 VIAL (ML) INJECTION ONCE
Refills: 0 | Status: DISCONTINUED | OUTPATIENT
Start: 2020-09-01 | End: 2020-09-01

## 2020-09-01 RX ORDER — DILTIAZEM HCL 120 MG
180 CAPSULE, EXT RELEASE 24 HR ORAL DAILY
Refills: 0 | Status: DISCONTINUED | OUTPATIENT
Start: 2020-09-01 | End: 2020-09-01

## 2020-09-01 RX ORDER — MAGNESIUM OXIDE 400 MG ORAL TABLET 241.3 MG
400 TABLET ORAL ONCE
Refills: 0 | Status: COMPLETED | OUTPATIENT
Start: 2020-09-01 | End: 2020-09-01

## 2020-09-01 RX ORDER — METOPROLOL TARTRATE 50 MG
50 TABLET ORAL ONCE
Refills: 0 | Status: COMPLETED | OUTPATIENT
Start: 2020-09-01 | End: 2020-09-01

## 2020-09-01 RX ADMIN — PANTOPRAZOLE SODIUM 40 MILLIGRAM(S): 20 TABLET, DELAYED RELEASE ORAL at 05:05

## 2020-09-01 RX ADMIN — Medication 650 MILLIGRAM(S): at 17:40

## 2020-09-01 RX ADMIN — Medication 650 MILLIGRAM(S): at 11:05

## 2020-09-01 RX ADMIN — Medication 30 UNIT(S): at 12:00

## 2020-09-01 RX ADMIN — OXYCODONE HYDROCHLORIDE 10 MILLIGRAM(S): 5 TABLET ORAL at 17:17

## 2020-09-01 RX ADMIN — Medication 650 MILLIGRAM(S): at 17:19

## 2020-09-01 RX ADMIN — OXYCODONE HYDROCHLORIDE 10 MILLIGRAM(S): 5 TABLET ORAL at 03:23

## 2020-09-01 RX ADMIN — APIXABAN 5 MILLIGRAM(S): 2.5 TABLET, FILM COATED ORAL at 05:05

## 2020-09-01 RX ADMIN — OXYCODONE HYDROCHLORIDE 10 MILLIGRAM(S): 5 TABLET ORAL at 04:02

## 2020-09-01 RX ADMIN — Medication 180 MILLIGRAM(S): at 12:39

## 2020-09-01 RX ADMIN — Medication 10 MILLIGRAM(S): at 12:39

## 2020-09-01 RX ADMIN — OXYCODONE HYDROCHLORIDE 10 MILLIGRAM(S): 5 TABLET ORAL at 17:40

## 2020-09-01 RX ADMIN — Medication 50 MILLIGRAM(S): at 12:38

## 2020-09-01 RX ADMIN — Medication 650 MILLIGRAM(S): at 02:37

## 2020-09-01 RX ADMIN — Medication 30 UNIT(S): at 07:57

## 2020-09-01 RX ADMIN — MAGNESIUM OXIDE 400 MG ORAL TABLET 400 MILLIGRAM(S): 241.3 TABLET ORAL at 14:09

## 2020-09-01 RX ADMIN — INSULIN GLARGINE 55 UNIT(S): 100 INJECTION, SOLUTION SUBCUTANEOUS at 07:57

## 2020-09-01 RX ADMIN — CHLORHEXIDINE GLUCONATE 1 APPLICATION(S): 213 SOLUTION TOPICAL at 05:05

## 2020-09-01 RX ADMIN — Medication 81 MILLIGRAM(S): at 11:05

## 2020-09-01 NOTE — PROGRESS NOTE ADULT - SUBJECTIVE AND OBJECTIVE BOX
Progress Note:  Provider Speciality                            Hospitalist      LAUREN ROSS [id number] 75y Female     CHIEF PRESENTING COMPLAINT:  Patient is a 75y old  Female who presents with a chief complaint of Fall (30 Aug 2020 11:07)        SUBJECTIVE:  Patient was seen and examined at bedside. Reports some improvement in generalized pain & lethargy   No significant overnight events reported.     HISTORY OF PRESENTING ILLNESS:  HPI:  Ms. Ross is a 76 y/o F with a PMH of HTN, DM on insulin pump, CHF, chronic afib on Eliquis, severe AS s/p TAVR 9 months ago, s/p permanent pacemaker implantation, R eye blindness (sudden onset 5 years ago), EMMA on CPAP, b/l rotator cuff injuries, chronic pain, total knee arthroplasty, and morbid obesity who presents for evaluation after a fall.    The patient is unable to ambulate at baseline and moves around with a scooter. While out shopping with her , she attempted to transfer from the scooter to the car and subsequently fell with her left leg being trapped under the car. The  called the fire department to help with freeing the patient and getting her into the car then an ambulance was called for transfer to the hospital. The patient had left leg pain from the knee down that she rated as 10/10 in intensity at that time, 8/10 now although her level of visible discomfort does not appear consistent with the numerical value she assigned to the pain. She denied losing consciousness, hitting her head, or sustaining other injuries in the fall. The patient had multiple mechanical falls at home but sustained no injuries.    The patient had a recent hospitalization for left leg cellulitis treated with IV antibiotics. She was discharged to a nursing facility then went home where she lives with her . (30 Aug 2020 04:48)        REVIEW OF SYSTEMS:  Patient denies any headache, any vision complaints, runny nose, fever, chills, sore throat. Denies chest pain, shortness of breath, palpitation. Denies nausea, vomiting, abdominal pain, diarrhoea, Denies urinary burning, urgency, frequency, dysuria. Denies weakness in any part of the body or numbness.   At least 10 systems were reviewed in ROS. All systems reviewed  are within normal limits except for the complaints as described in Subjective.    PAST MEDICAL & SURGICAL HISTORY:  PAST MEDICAL & SURGICAL HISTORY:  Pacemaker  Asthma  HTN (hypertension)  DM (diabetes mellitus)  History of pacemaker          VITAL SIGNS:  Vital Signs Last 24 Hrs  T(C): 35.8 (31 Aug 2020 07:44), Max: 37.3 (31 Aug 2020 00:00)  T(F): 96.5 (31 Aug 2020 07:44), Max: 99.2 (31 Aug 2020 00:00)  HR: 76 (31 Aug 2020 07:44) (65 - 76)  BP: 158/67 (31 Aug 2020 07:44) (132/61 - 158/67)  BP(mean): --  RR: 18 (31 Aug 2020 07:44) (18 - 18)  SpO2: --        PHYSICAL EXAMINATION:  Not in acute distress, morbid opbesity  General: No pallor, no icterus  HEENT:   EOMI, no JVD.  Heart: S1+S2 audible  Lungs: bilateral  moderate air entry, no wheezing, no crepitations.  Abdomen: Soft, non-tender, non-distended , no  rigidity or guarding.  CNS: Awake alert, CN  grossly intact.  Extremities:  chronic venous stasis         CONSULTS:  Consultant(s) Notes Reviewed by me.   Care Discussed with Consultants/Other Providers where required.        MEDICATIONS:  MEDICATIONS  (STANDING):  apixaban 5 milliGRAM(s) Oral every 12 hours  aspirin  chewable 81 milliGRAM(s) Oral daily  chlorhexidine 4% Liquid 1 Application(s) Topical <User Schedule>  insulin glargine Injectable (LANTUS) 55 Unit(s) SubCutaneous two times a day  insulin lispro Injectable (HumaLOG) 30 Unit(s) SubCutaneous three times a day before meals  pantoprazole    Tablet 40 milliGRAM(s) Oral before breakfast    MEDICATIONS  (PRN):  oxyCODONE    IR 10 milliGRAM(s) Oral every 4 hours PRN Moderate Pain (4 - 6)            ASSESSMENT:        Patient was seen independently. Latest vital signs and labs were reviewed. Case was discussed with house staff. Agree with resident's assessment and plan     with following additions/modifications.       Assessment :  Mechanical fall, no fracture  Diabetes mellitis type 2   chronic Atrial fibrillation , on anti-coagulation with Eliquis  severe AS s/p TAVR  Obstructive sleep apnea , on cpap  Osteoarthritis , status post knee arthroplasty  Morbid obesity      Plan:   Trauma work up negative for acute fracture  Hardware in place without any complication  Resumed home dose of Lantus & lispro. Endocrine as outpatient is he needs to be back on insulin mump   Low concern for Cellulitis - d/c antibiotics   Diabetes mellitis type 2 - endocrine consult as outpatient  Insulin sliding scale with coverage with meals and QHS   Pain management   Bowel regimen  Electrolyte supplementation and follow up with BMP. Keep K+>4, Mg>2   chronic Atrial fibrillation - continue with anti-coagulation with Eliquis  Discharge Disposition: Discharge today home with family care Progress Note:  Provider Speciality                            Hospitalist      LAUREN ROSS [id number] 75y Female     CHIEF PRESENTING COMPLAINT:  Patient is a 75y old  Female who presents with a chief complaint of Fall (30 Aug 2020 11:07)        SUBJECTIVE:  Patient was seen and examined at bedside. Reports some improvement in generalized pain & lethargy   No significant overnight events reported.     HISTORY OF PRESENTING ILLNESS:  HPI:  Ms. Ross is a 76 y/o F with a PMH of HTN, DM on insulin pump, CHF, chronic afib on Eliquis, severe AS s/p TAVR 9 months ago, s/p permanent pacemaker implantation, R eye blindness (sudden onset 5 years ago), EMMA on CPAP, b/l rotator cuff injuries, chronic pain, total knee arthroplasty, and morbid obesity who presents for evaluation after a fall.    The patient is unable to ambulate at baseline and moves around with a scooter. While out shopping with her , she attempted to transfer from the scooter to the car and subsequently fell with her left leg being trapped under the car. The  called the fire department to help with freeing the patient and getting her into the car then an ambulance was called for transfer to the hospital. The patient had left leg pain from the knee down that she rated as 10/10 in intensity at that time, 8/10 now although her level of visible discomfort does not appear consistent with the numerical value she assigned to the pain. She denied losing consciousness, hitting her head, or sustaining other injuries in the fall. The patient had multiple mechanical falls at home but sustained no injuries.    The patient had a recent hospitalization for left leg cellulitis treated with IV antibiotics. She was discharged to a nursing facility then went home where she lives with her . (30 Aug 2020 04:48)        REVIEW OF SYSTEMS:  Patient denies any headache, any vision complaints, runny nose, fever, chills, sore throat. Denies chest pain, shortness of breath, palpitation. Denies nausea, vomiting, abdominal pain, diarrhoea, Denies urinary burning, urgency, frequency, dysuria. Denies weakness in any part of the body or numbness.   At least 10 systems were reviewed in ROS. All systems reviewed  are within normal limits except for the complaints as described in Subjective.    PAST MEDICAL & SURGICAL HISTORY:  PAST MEDICAL & SURGICAL HISTORY:  Pacemaker  Asthma  HTN (hypertension)  DM (diabetes mellitus)  History of pacemaker          VITAL SIGNS:  Vital Signs Last 24 Hrs  T(C): 35.8 (31 Aug 2020 07:44), Max: 37.3 (31 Aug 2020 00:00)  T(F): 96.5 (31 Aug 2020 07:44), Max: 99.2 (31 Aug 2020 00:00)  HR: 76 (31 Aug 2020 07:44) (65 - 76)  BP: 158/67 (31 Aug 2020 07:44) (132/61 - 158/67)  BP(mean): --  RR: 18 (31 Aug 2020 07:44) (18 - 18)  SpO2: --        PHYSICAL EXAMINATION:  Not in acute distress, morbid opbesity  General: No pallor, no icterus  HEENT:   EOMI, no JVD.  Heart: S1+S2 audible  Lungs: bilateral  moderate air entry, no wheezing, no crepitations.  Abdomen: Soft, non-tender, non-distended , no  rigidity or guarding.  CNS: Awake alert, CN  grossly intact.  Extremities:  chronic venous stasis         CONSULTS:  Consultant(s) Notes Reviewed by me.   Care Discussed with Consultants/Other Providers where required.        MEDICATIONS:  MEDICATIONS  (STANDING):  apixaban 5 milliGRAM(s) Oral every 12 hours  aspirin  chewable 81 milliGRAM(s) Oral daily  chlorhexidine 4% Liquid 1 Application(s) Topical <User Schedule>  insulin glargine Injectable (LANTUS) 55 Unit(s) SubCutaneous two times a day  insulin lispro Injectable (HumaLOG) 30 Unit(s) SubCutaneous three times a day before meals  pantoprazole    Tablet 40 milliGRAM(s) Oral before breakfast    MEDICATIONS  (PRN):  oxyCODONE    IR 10 milliGRAM(s) Oral every 4 hours PRN Moderate Pain (4 - 6)            ASSESSMENT:        Patient was seen independently. Latest vital signs and labs were reviewed. Case was discussed with house staff. Agree with resident's assessment and plan     with following additions/modifications.       Assessment :  Mechanical fall, no fracture  Diabetes mellitis type 2   chronic Atrial fibrillation , on anti-coagulation with Eliquis  severe AS s/p TAVR  Obstructive sleep apnea , on cpap  Osteoarthritis , status post knee arthroplasty  Morbid obesity  Hypomagnesemia       Plan:   Trauma work up negative for acute fracture  Hardware in place without any complication  Resumed home dose of Lantus & lispro. Endocrine as outpatient is he needs to be back on insulin mump   Low concern for Cellulitis - d/c antibiotics   Diabetes mellitis type 2 - endocrine consult as outpatient  Insulin sliding scale with coverage with meals and QHS   Pain management   Bowel regimen  Electrolyte supplementation and follow up with BMP. Keep K+>4, Mg>2   chronic Atrial fibrillation - continue with anti-coagulation with Eliquis  Discharge Disposition: Discharge today home with family care Progress Note:  Provider Speciality                            Hospitalist      LAUREN ROSS MRN-[id number] 75y Female     CHIEF PRESENTING COMPLAINT:  Patient is a 75y old  Female who presents with a chief complaint of Fall (30 Aug 2020 11:07)        SUBJECTIVE:  Patient was seen and examined at bedside. Reports some improvement in generalized pain & lethargy   No significant overnight events reported.     HISTORY OF PRESENTING ILLNESS:  HPI:  Ms. Ross is a 76 y/o F with a PMH of HTN, DM on insulin pump, CHF, chronic afib on Eliquis, severe AS s/p TAVR 9 months ago, s/p permanent pacemaker implantation, R eye blindness (sudden onset 5 years ago), EMMA on CPAP, b/l rotator cuff injuries, chronic pain, total knee arthroplasty, and morbid obesity who presents for evaluation after a fall.    The patient is unable to ambulate at baseline and moves around with a scooter. While out shopping with her , she attempted to transfer from the scooter to the car and subsequently fell with her left leg being trapped under the car. The  called the fire department to help with freeing the patient and getting her into the car then an ambulance was called for transfer to the hospital. The patient had left leg pain from the knee down that she rated as 10/10 in intensity at that time, 8/10 now although her level of visible discomfort does not appear consistent with the numerical value she assigned to the pain. She denied losing consciousness, hitting her head, or sustaining other injuries in the fall. The patient had multiple mechanical falls at home but sustained no injuries.    The patient had a recent hospitalization for left leg cellulitis treated with IV antibiotics. She was discharged to a nursing facility then went home where she lives with her . (30 Aug 2020 04:48)        REVIEW OF SYSTEMS:  Patient denies any headache, any vision complaints, runny nose, fever, chills, sore throat. Denies chest pain, shortness of breath, palpitation. Denies nausea, vomiting, abdominal pain, diarrhoea, Denies urinary burning, urgency, frequency, dysuria. Denies weakness in any part of the body or numbness.   At least 10 systems were reviewed in ROS. All systems reviewed  are within normal limits except for the complaints as described in Subjective.    PAST MEDICAL & SURGICAL HISTORY:  PAST MEDICAL & SURGICAL HISTORY:  Pacemaker  Asthma  HTN (hypertension)  DM (diabetes mellitus)  History of pacemaker          VITAL SIGNS:  Vital Signs Last 24 Hrs  T(C): 35.8 (31 Aug 2020 07:44), Max: 37.3 (31 Aug 2020 00:00)  T(F): 96.5 (31 Aug 2020 07:44), Max: 99.2 (31 Aug 2020 00:00)  HR: 76 (31 Aug 2020 07:44) (65 - 76)  BP: 158/67 (31 Aug 2020 07:44) (132/61 - 158/67)  BP(mean): --  RR: 18 (31 Aug 2020 07:44) (18 - 18)  SpO2: --        PHYSICAL EXAMINATION:  Not in acute distress, morbid opbesity  General: No pallor, no icterus  HEENT:   EOMI, no JVD.  Heart: S1+S2 audible  Lungs: bilateral  moderate air entry, no wheezing, no crepitations.  Abdomen: Soft, non-tender, non-distended , no  rigidity or guarding.  CNS: Awake alert, CN  grossly intact.  Extremities:  chronic venous stasis         CONSULTS:  Consultant(s) Notes Reviewed by me.   Care Discussed with Consultants/Other Providers where required.        MEDICATIONS:  MEDICATIONS  (STANDING):  apixaban 5 milliGRAM(s) Oral every 12 hours  aspirin  chewable 81 milliGRAM(s) Oral daily  chlorhexidine 4% Liquid 1 Application(s) Topical <User Schedule>  insulin glargine Injectable (LANTUS) 55 Unit(s) SubCutaneous two times a day  insulin lispro Injectable (HumaLOG) 30 Unit(s) SubCutaneous three times a day before meals  pantoprazole    Tablet 40 milliGRAM(s) Oral before breakfast    MEDICATIONS  (PRN):  oxyCODONE    IR 10 milliGRAM(s) Oral every 4 hours PRN Moderate Pain (4 - 6)            ASSESSMENT:        Patient was seen independently. Latest vital signs and labs were reviewed. Case was discussed with house staff. Agree with resident's assessment and plan     with following additions/modifications.       Assessment :  Mechanical fall, no fracture  Diabetes mellitis type 2   chronic Atrial fibrillation , on anti-coagulation with Eliquis  severe AS s/p TAVR  Obstructive sleep apnea , on cpap  Osteoarthritis , status post knee arthroplasty  Morbid obesity  Hypomagnesemia       Plan:   Trauma work up negative for acute fracture  Hardware in place without any complication  Resumed home dose of Lantus & lispro. Endocrine as outpatient is he needs to be back on insulin mump   Low concern for Cellulitis - no need for antibiotics   Diabetes mellitis type 2 - endocrine consult as outpatient  Insulin sliding scale with coverage with meals and QHS   Pain management   Bowel regimen  Electrolyte supplementation and follow up with BMP. Keep K+>4, Mg>2   chronic Atrial fibrillation - continue with anti-coagulation with Eliquis  Discharge Disposition: Discharge today home with family care Progress Note:  Provider Speciality                            Hospitalist      LAUREN ROSS [id number] 75y Female     CHIEF PRESENTING COMPLAINT:  Patient is a 75y old  Female who presents with a chief complaint of Fall (30 Aug 2020 11:07)        SUBJECTIVE:  Patient was seen and examined at bedside. Reports some improvement in generalized pain & lethargy   No significant overnight events reported.     HISTORY OF PRESENTING ILLNESS:  HPI:  Ms. Ross is a 74 y/o F with a PMH of HTN, DM on insulin pump, CHF, chronic afib on Eliquis, severe AS s/p TAVR 9 months ago, s/p permanent pacemaker implantation, R eye blindness (sudden onset 5 years ago), EMMA on CPAP, b/l rotator cuff injuries, chronic pain, total knee arthroplasty, and morbid obesity who presents for evaluation after a fall.    The patient is unable to ambulate at baseline and moves around with a scooter. While out shopping with her , she attempted to transfer from the scooter to the car and subsequently fell with her left leg being trapped under the car. The  called the fire department to help with freeing the patient and getting her into the car then an ambulance was called for transfer to the hospital. The patient had left leg pain from the knee down that she rated as 10/10 in intensity at that time, 8/10 now although her level of visible discomfort does not appear consistent with the numerical value she assigned to the pain. She denied losing consciousness, hitting her head, or sustaining other injuries in the fall. The patient had multiple mechanical falls at home but sustained no injuries.    The patient had a recent hospitalization for left leg cellulitis treated with IV antibiotics. She was discharged to a nursing facility then went home where she lives with her . (30 Aug 2020 04:48)        REVIEW OF SYSTEMS:  Patient denies any headache, any vision complaints, runny nose, fever, chills, sore throat. Denies chest pain, shortness of breath, palpitation. Denies nausea, vomiting, abdominal pain, diarrhoea, Denies urinary burning, urgency, frequency, dysuria. Denies weakness in any part of the body or numbness.   At least 10 systems were reviewed in ROS. All systems reviewed  are within normal limits except for the complaints as described in Subjective.    PAST MEDICAL & SURGICAL HISTORY:  PAST MEDICAL & SURGICAL HISTORY:  Pacemaker  Asthma  HTN (hypertension)  DM (diabetes mellitus)  History of pacemaker          VITAL SIGNS:  Vital Signs Last 24 Hrs  T(C): 35.8 (31 Aug 2020 07:44), Max: 37.3 (31 Aug 2020 00:00)  T(F): 96.5 (31 Aug 2020 07:44), Max: 99.2 (31 Aug 2020 00:00)  HR: 76 (31 Aug 2020 07:44) (65 - 76)  BP: 158/67 (31 Aug 2020 07:44) (132/61 - 158/67)  BP(mean): --  RR: 18 (31 Aug 2020 07:44) (18 - 18)  SpO2: --        PHYSICAL EXAMINATION:  Not in acute distress, morbid opbesity  General: No pallor, no icterus  HEENT:   EOMI, no JVD.  Heart: S1+S2 audible  Lungs: bilateral  moderate air entry, no wheezing, no crepitations.  Abdomen: Soft, non-tender, non-distended , no  rigidity or guarding.  CNS: Awake alert, CN  grossly intact.  Extremities:  chronic venous stasis         CONSULTS:  Consultant(s) Notes Reviewed by me.   Care Discussed with Consultants/Other Providers where required.        MEDICATIONS:  MEDICATIONS  (STANDING):  apixaban 5 milliGRAM(s) Oral every 12 hours  aspirin  chewable 81 milliGRAM(s) Oral daily  chlorhexidine 4% Liquid 1 Application(s) Topical <User Schedule>  insulin glargine Injectable (LANTUS) 55 Unit(s) SubCutaneous two times a day  insulin lispro Injectable (HumaLOG) 30 Unit(s) SubCutaneous three times a day before meals  pantoprazole    Tablet 40 milliGRAM(s) Oral before breakfast    MEDICATIONS  (PRN):  oxyCODONE    IR 10 milliGRAM(s) Oral every 4 hours PRN Moderate Pain (4 - 6)            ASSESSMENT:        Patient was seen independently. Latest vital signs and labs were reviewed. Case was discussed with house staff. Agree with resident's assessment and plan     with following additions/modifications.       Assessment :  Mechanical fall, no fracture  Diabetes mellitis type 2   chronic Atrial fibrillation , on anti-coagulation with Eliquis  severe AS s/p TAVR  Chronic diastolic CHF - no acute decompensation  Obstructive sleep apnea , on cpap  Osteoarthritis , status post knee arthroplasty  Morbid obesity  Hypomagnesemia       Plan:   Trauma work up negative for acute fracture  Hardware in place without any complication  Resumed home dose of Lantus & lispro. Endocrine as outpatient is he needs to be back on insulin mump   Low concern for Cellulitis - no need for antibiotics   Diabetes mellitis type 2 - endocrine consult as outpatient  Insulin sliding scale with coverage with meals and QHS   Chronic diastolic CHF - no acute decompensation  Pain management   Bowel regimen  Electrolyte supplementation and follow up with BMP. Keep K+>4, Mg>2   chronic Atrial fibrillation - continue with anti-coagulation with Eliquis  Discharge Disposition: Discharge today home with family care

## 2020-09-01 NOTE — DISCHARGE NOTE PROVIDER - NSDCCPCAREPLAN_GEN_ALL_CORE_FT
PRINCIPAL DISCHARGE DIAGNOSIS  Diagnosis: Inability to ambulate due to multiple joints  Assessment and Plan of Treatment: You were seen in hospital due to inability to ambulate after falling while moving between wheelchair and car. Full workup in hospital showed no acute problems in your affected legs. We are sending you home with home health aid for further rehabilitation, as you declined any rehabilitation facility. Please follow up with your primary medical doctor.; Please follow up with your endocrinologist for recalibration of your insulin pump      SECONDARY DISCHARGE DIAGNOSES  Diagnosis: Accidental fall  Assessment and Plan of Treatment:     Diagnosis: Intractable pain  Assessment and Plan of Treatment:
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
## 2020-09-01 NOTE — DISCHARGE NOTE PROVIDER - NSDCMRMEDTOKEN_GEN_ALL_CORE_FT
acetaminophen 325 mg oral tablet: 2 tab(s) orally every 6 hours, As needed, Mild Pain (1 - 3)  aspirin 81 mg oral tablet: 1 tab(s) orally once a day  bisacodyl 5 mg oral delayed release tablet: 1 tab(s) orally once a day (at bedtime)  dilTIAZem 360 mg/24 hours oral capsule, extended release: 1 cap(s) orally once a day  doxycycline monohydrate 100 mg oral capsule: 1 cap(s) orally every 12 hours LAST DOSE 6/26 PM  DULoxetine 60 mg oral delayed release capsule: 1 cap(s) orally once a day  Eliquis 5 mg oral tablet: 1 tab(s) orally 2 times a day  ferrous sulfate 325 mg (65 mg elemental iron) oral tablet: 1 tab(s) orally 3 times a day  insulin glargine: 55 unit(s) subcutaneous 2 times a day once in AM and once at night  insulin lispro: 30 unit(s) subcutaneous 3 times a day (before meals)   metoprolol tartrate 50 mg oral tablet: 1 tab(s) orally 2 times a day  omeprazole 40 mg oral delayed release capsule:   oxyCODONE 10 mg oral tablet: 1 tab(s) orally every 4 hours, As needed, Moderate Pain (4 - 6)  polyethylene glycol 3350 oral powder for reconstitution: 17 gram(s) orally once a day  pregabalin 100 mg oral capsule: 1 tab(s) orally 3 times a day  rosuvastatin 20 mg oral tablet: 1 tab(s) orally once a day  senna oral tablet: 2 tab(s) orally once a day (at bedtime)  silver sulfADIAZINE 1% topical cream: 1 application topically 2 times a day  spironolactone 25 mg oral tablet: 1 tab(s) orally every other day (at bedtime)  torsemide 10 mg oral tablet: 1 tab(s) orally once a day acetaminophen 325 mg oral tablet: 2 tab(s) orally every 6 hours, As needed, Mild Pain (1 - 3)  aspirin 81 mg oral tablet: 1 tab(s) orally once a day  bisacodyl 5 mg oral delayed release tablet: 1 tab(s) orally once a day (at bedtime)  dilTIAZem 360 mg/24 hours oral capsule, extended release: 1 cap(s) orally once a day  DULoxetine 60 mg oral delayed release capsule: 1 cap(s) orally once a day  Eliquis 5 mg oral tablet: 1 tab(s) orally 2 times a day  ferrous sulfate 325 mg (65 mg elemental iron) oral tablet: 1 tab(s) orally 3 times a day  insulin glargine: 55 unit(s) subcutaneous 2 times a day once in AM and once at night  insulin lispro: 30 unit(s) subcutaneous 3 times a day (before meals)   metoprolol tartrate 50 mg oral tablet: 1 tab(s) orally 2 times a day  omeprazole 40 mg oral delayed release capsule:   oxyCODONE 10 mg oral tablet: 1 tab(s) orally every 4 hours, As needed, Moderate Pain (4 - 6)  polyethylene glycol 3350 oral powder for reconstitution: 17 gram(s) orally once a day  pregabalin 100 mg oral capsule: 1 tab(s) orally 3 times a day  rosuvastatin 20 mg oral tablet: 1 tab(s) orally once a day  senna oral tablet: 2 tab(s) orally once a day (at bedtime)  silver sulfADIAZINE 1% topical cream: 1 application topically 2 times a day  spironolactone 25 mg oral tablet: 1 tab(s) orally every other day (at bedtime)  torsemide 10 mg oral tablet: 1 tab(s) orally once a day

## 2020-09-01 NOTE — DISCHARGE NOTE PROVIDER - CARE PROVIDER_API CALL
Craig Victoria  ENDOCRINOLOGY/METAB/DIABETES  [address] [place] [address]  Phone: [phone]  Fax: [phone]  Established Patient  Follow Up Time: 1 week
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
## 2020-09-01 NOTE — DISCHARGE NOTE PROVIDER - NSDCFUADDAPPT_GEN_ALL_CORE_FT
Please see Dr. Srinivasan within 1 week of discharge for recalibration of your insulin pump. The doctor is aware of your situation and is expecting you.

## 2020-09-01 NOTE — DISCHARGE NOTE NURSING/CASE MANAGEMENT/SOCIAL WORK - PATIENT PORTAL LINK FT
You can access the FollowMyHealth Patient Portal offered by [facility] by registering at the following website: [email or web address]. By joining [employer]’s FollowMyHealth portal, you will also be able to view your health information using other applications (apps) compatible with our system.
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
## 2020-09-02 DIAGNOSIS — S81.801A UNSPECIFIED OPEN WOUND, RIGHT LOWER LEG, INITIAL ENCOUNTER: ICD-10-CM

## 2020-09-02 DIAGNOSIS — S81.802A UNSPECIFIED OPEN WOUND, LEFT LOWER LEG, INITIAL ENCOUNTER: ICD-10-CM
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
## 2020-09-02 LAB
6-ACETYLMORPHINE, UR RESULT: NEGATIVE NG/ML — SIGNIFICANT CHANGE UP
6MAM UR CFM-MCNC: NEGATIVE NG/ML — SIGNIFICANT CHANGE UP
CODEINE UR CFM-MCNC: NEGATIVE NG/ML — SIGNIFICANT CHANGE UP
CODEINE, UR RESULT: NEGATIVE NG/ML — SIGNIFICANT CHANGE UP
HYDROCODONE UR QL CFM: NEGATIVE NG/ML — SIGNIFICANT CHANGE UP
HYDROCODONE, UR RESULT: NEGATIVE NG/ML — SIGNIFICANT CHANGE UP
HYDROMORPHONE UR QL CFM: NEGATIVE NG/ML — SIGNIFICANT CHANGE UP
HYDROMORPHONE, UR RESULT: NEGATIVE NG/ML — SIGNIFICANT CHANGE UP
MORPHINE UR QL CFM: 816 NG/ML — SIGNIFICANT CHANGE UP
MORPHINE, UR RESULT: 816 NG/ML — SIGNIFICANT CHANGE UP
NOROXYCODONE (OPIATES), UR RESULT: NEGATIVE NG/ML — SIGNIFICANT CHANGE UP
NOROXYCODONE UR CFM-MCNC: NEGATIVE NG/ML — SIGNIFICANT CHANGE UP
OPIATES IN-HOUSE INTERPRETATION: POSITIVE
OPIATES UR QL CFM: POSITIVE
OXYCODONE (OPIATES), UR RESULT: NEGATIVE NG/ML — SIGNIFICANT CHANGE UP
OXYCODONE UR-MCNC: NEGATIVE NG/ML — SIGNIFICANT CHANGE UP
OXYMORPHONE (OPIATES), UR RESULT: NEGATIVE NG/ML — SIGNIFICANT CHANGE UP
OXYMORPHONE UR CFM-MCNC: NEGATIVE NG/ML — SIGNIFICANT CHANGE UP

## 2020-09-03 DIAGNOSIS — Z91.81 HISTORY OF FALLING: ICD-10-CM

## 2020-09-03 DIAGNOSIS — Z79.4 LONG TERM (CURRENT) USE OF INSULIN: ICD-10-CM

## 2020-09-03 DIAGNOSIS — L60.0 INGROWING NAIL: ICD-10-CM

## 2020-09-03 DIAGNOSIS — Z91.14 PATIENT'S OTHER NONCOMPLIANCE WITH MEDICATION REGIMEN: ICD-10-CM

## 2020-09-03 DIAGNOSIS — M19.90 UNSPECIFIED OSTEOARTHRITIS, UNSPECIFIED SITE: ICD-10-CM

## 2020-09-03 DIAGNOSIS — M89.8X8 OTHER SPECIFIED DISORDERS OF BONE, OTHER SITE: ICD-10-CM

## 2020-09-03 DIAGNOSIS — E66.01 MORBID (SEVERE) OBESITY DUE TO EXCESS CALORIES: ICD-10-CM

## 2020-09-03 DIAGNOSIS — G47.33 OBSTRUCTIVE SLEEP APNEA (ADULT) (PEDIATRIC): ICD-10-CM
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
## 2020-09-03 DIAGNOSIS — Z95.0 PRESENCE OF CARDIAC PACEMAKER: ICD-10-CM

## 2020-09-03 DIAGNOSIS — E78.5 HYPERLIPIDEMIA, UNSPECIFIED: ICD-10-CM

## 2020-09-03 DIAGNOSIS — I50.32 CHRONIC DIASTOLIC (CONGESTIVE) HEART FAILURE: ICD-10-CM

## 2020-09-03 DIAGNOSIS — Z96.41 PRESENCE OF INSULIN PUMP (EXTERNAL) (INTERNAL): ICD-10-CM

## 2020-09-03 DIAGNOSIS — M79.662 PAIN IN LEFT LOWER LEG: ICD-10-CM

## 2020-09-03 DIAGNOSIS — J45.909 UNSPECIFIED ASTHMA, UNCOMPLICATED: ICD-10-CM

## 2020-09-03 DIAGNOSIS — G89.11 ACUTE PAIN DUE TO TRAUMA: ICD-10-CM
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
## 2020-09-03 DIAGNOSIS — G89.29 OTHER CHRONIC PAIN: ICD-10-CM

## 2020-09-03 DIAGNOSIS — I48.0 PAROXYSMAL ATRIAL FIBRILLATION: ICD-10-CM

## 2020-09-03 DIAGNOSIS — H54.7 UNSPECIFIED VISUAL LOSS: ICD-10-CM

## 2020-09-03 DIAGNOSIS — R26.2 DIFFICULTY IN WALKING, NOT ELSEWHERE CLASSIFIED: ICD-10-CM

## 2020-09-03 DIAGNOSIS — E11.40 TYPE 2 DIABETES MELLITUS WITH DIABETIC NEUROPATHY, UNSPECIFIED: ICD-10-CM

## 2020-09-03 DIAGNOSIS — Z79.01 LONG TERM (CURRENT) USE OF ANTICOAGULANTS: ICD-10-CM

## 2020-09-03 DIAGNOSIS — Z96.652 PRESENCE OF LEFT ARTIFICIAL KNEE JOINT: ICD-10-CM

## 2020-09-03 DIAGNOSIS — Z79.82 LONG TERM (CURRENT) USE OF ASPIRIN: ICD-10-CM

## 2020-09-03 DIAGNOSIS — Z86.74 PERSONAL HISTORY OF SUDDEN CARDIAC ARREST: ICD-10-CM

## 2020-09-03 DIAGNOSIS — I11.0 HYPERTENSIVE HEART DISEASE WITH HEART FAILURE: ICD-10-CM

## 2020-09-14 DIAGNOSIS — Z95.2 PRESENCE OF PROSTHETIC HEART VALVE: ICD-10-CM

## 2022-01-17 NOTE — PATIENT PROFILE ADULT - FUNCTIONAL SCREEN CURRENT LEVEL: SWALLOWING (IF SCORE 2 OR MORE FOR ANY ITEM, CONSULT REHAB SERVICES), MLM)
Progress Notes  GILBERTO Talbert (Nurse Practitioner) • • Nurse Practitioner  After reviewing the patient's responses to the questionnaire and additional information in their electronic health record, I have determined that their illness cannot safely and effectively be addressed through a virtual visit. It requires a face-to-face evaluation by a Physician, Nurse Practitioner, or Physician Assistant.      The patient will be advised to seek face-to-face care at a local Urgent Care or Immediate Care Center for an expedited and thorough face-to-face evaluation and consideration for diagnostic testing for this medical condition.      \"Nausea, diarrhea for multiple days, pain in and around rectum from constant diarrhea. Also having hiccups constantly, four time today, lasting about an hour each time.\"     Recommend in person evaluation.                    Patient contacted & notified of disqualified E/V-visit.   Patient informed that they would not be charged for the visit.     Pt notified of the above recommendation and pt verbalized understanding and was agreeable.     Denies other questions or concerns at this time.            
0 = swallows foods/liquids without difficulty

## 2023-03-29 NOTE — ED PROVIDER NOTE - CCCP TRG CHIEF CMPLNT
Impression: Other chronic allergic conjunctivitis: H10.45. Plan: Patient educated that symptoms are caused by ocular allergies and that treatment will help alleviate symptoms but that it will not prevent allergies. Patient educated that allergy testing with an allergy specialist may be necessary to identify  allergens affecting patient and treat condition. Prescribed Prednisolone acetate 1% QID OU 1 wk, BID 1 wk, QD 1 wk then D/C, Olopatadine QD OU for maintenance. May use Opcon A OTC for ocular itch. medical evaluation normal...

## 2023-07-07 NOTE — DISCHARGE NOTE PROVIDER - NSDCHHATTENDCERT_GEN_ALL_CORE
There are no Wet Read(s) to document.
My signature below certifies that the above stated patient is homebound and upon completion of the Face-To-Face encounter, has the need for intermittent skilled nursing, physical therapy and/or speech or occupational therapy services in their home for their current diagnosis as outlined in their initial plan of care. These services will continue to be monitored by myself or another physician.